# Patient Record
Sex: MALE | Employment: FULL TIME | URBAN - METROPOLITAN AREA
[De-identification: names, ages, dates, MRNs, and addresses within clinical notes are randomized per-mention and may not be internally consistent; named-entity substitution may affect disease eponyms.]

---

## 2021-01-01 ENCOUNTER — APPOINTMENT (OUTPATIENT)
Dept: CT IMAGING | Age: 46
DRG: 958 | End: 2021-01-01
Payer: OTHER GOVERNMENT

## 2021-01-01 ENCOUNTER — ANESTHESIA (OUTPATIENT)
Dept: OPERATING ROOM | Age: 46
DRG: 958 | End: 2021-01-01
Payer: OTHER GOVERNMENT

## 2021-01-01 ENCOUNTER — APPOINTMENT (OUTPATIENT)
Dept: GENERAL RADIOLOGY | Age: 46
DRG: 958 | End: 2021-01-01
Payer: OTHER GOVERNMENT

## 2021-01-01 ENCOUNTER — HOSPITAL ENCOUNTER (INPATIENT)
Age: 46
LOS: 8 days | Discharge: HOME | DRG: 958 | End: 2021-01-09
Attending: EMERGENCY MEDICINE | Admitting: SURGERY
Payer: OTHER GOVERNMENT

## 2021-01-01 ENCOUNTER — ANESTHESIA EVENT (OUTPATIENT)
Dept: OPERATING ROOM | Age: 46
DRG: 958 | End: 2021-01-01
Payer: OTHER GOVERNMENT

## 2021-01-01 ENCOUNTER — APPOINTMENT (OUTPATIENT)
Dept: MRI IMAGING | Age: 46
DRG: 958 | End: 2021-01-01
Payer: OTHER GOVERNMENT

## 2021-01-01 VITALS — OXYGEN SATURATION: 100 % | TEMPERATURE: 99.6 F | SYSTOLIC BLOOD PRESSURE: 110 MMHG | DIASTOLIC BLOOD PRESSURE: 55 MMHG
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
## 2021-01-01 LAB
ABO/RH: NORMAL
ABSOLUTE EOS #: <0.03 K/UL (ref 0–0.44)
ABSOLUTE IMMATURE GRANULOCYTE: 0.04 K/UL (ref 0–0.3)
ABSOLUTE LYMPH #: 0.71 K/UL (ref 1.1–3.7)
ABSOLUTE MONO #: 0.16 K/UL (ref 0.1–1.2)
ALLEN TEST: ABNORMAL
ANION GAP SERPL CALCULATED.3IONS-SCNC: 10 MMOL/L (ref 9–17)
ANION GAP SERPL CALCULATED.3IONS-SCNC: 12 MMOL/L (ref 9–17)
ANTIBODY SCREEN: NEGATIVE
ARM BAND NUMBER: NORMAL
BASOPHILS # BLD: 0 % (ref 0–2)
BASOPHILS ABSOLUTE: <0.03 K/UL (ref 0–0.2)
BLOOD BANK SPECIMEN: ABNORMAL
BUN BLDV-MCNC: 12 MG/DL (ref 6–20)
BUN BLDV-MCNC: 13 MG/DL (ref 6–20)
BUN/CREAT BLD: ABNORMAL (ref 9–20)
CALCIUM IONIZED: 1.08 MMOL/L (ref 1.13–1.33)
CALCIUM SERPL-MCNC: 8.4 MG/DL (ref 8.6–10.4)
CARBOXYHEMOGLOBIN: 6.1 % (ref 0–5)
CHLORIDE BLD-SCNC: 106 MMOL/L (ref 98–107)
CHLORIDE BLD-SCNC: 107 MMOL/L (ref 98–107)
CO2: 21 MMOL/L (ref 20–31)
CO2: 22 MMOL/L (ref 20–31)
CREAT SERPL-MCNC: 0.72 MG/DL (ref 0.7–1.2)
CREAT SERPL-MCNC: 0.73 MG/DL (ref 0.7–1.2)
DIFFERENTIAL TYPE: ABNORMAL
EOSINOPHILS RELATIVE PERCENT: 0 % (ref 1–4)
ETHANOL PERCENT: 0.09 %
ETHANOL: 92 MG/DL
EXPIRATION DATE: NORMAL
FIO2: ABNORMAL
GFR AFRICAN AMERICAN: >60 ML/MIN
GFR AFRICAN AMERICAN: >60 ML/MIN
GFR NON-AFRICAN AMERICAN: >60 ML/MIN
GFR NON-AFRICAN AMERICAN: >60 ML/MIN
GFR SERPL CREATININE-BSD FRML MDRD: ABNORMAL ML/MIN/{1.73_M2}
GLUCOSE BLD-MCNC: 86 MG/DL (ref 70–99)
GLUCOSE BLD-MCNC: 94 MG/DL (ref 70–99)
HCG QUALITATIVE: ABNORMAL
HCO3 VENOUS: 25.6 MMOL/L (ref 24–30)
HCT VFR BLD CALC: 43.5 % (ref 40.7–50.3)
HCT VFR BLD CALC: 43.6 % (ref 40.7–50.3)
HEMOGLOBIN: 14.3 G/DL (ref 13–17)
HEMOGLOBIN: 14.3 G/DL (ref 13–17)
IMMATURE GRANULOCYTES: 0 %
INR BLD: 0.9
LYMPHOCYTES # BLD: 6 % (ref 24–43)
MAGNESIUM: 1.8 MG/DL (ref 1.6–2.6)
MCH RBC QN AUTO: 31.6 PG (ref 25.2–33.5)
MCH RBC QN AUTO: 32.4 PG (ref 25.2–33.5)
MCHC RBC AUTO-ENTMCNC: 32.8 G/DL (ref 28.4–34.8)
MCHC RBC AUTO-ENTMCNC: 32.9 G/DL (ref 28.4–34.8)
MCV RBC AUTO: 96.5 FL (ref 82.6–102.9)
MCV RBC AUTO: 98.4 FL (ref 82.6–102.9)
METHEMOGLOBIN: ABNORMAL % (ref 0–1.5)
MODE: ABNORMAL
MONOCYTES # BLD: 1 % (ref 3–12)
NEGATIVE BASE EXCESS, VEN: 0 MMOL/L (ref 0–2)
NOTIFICATION TIME: ABNORMAL
NOTIFICATION: ABNORMAL
NRBC AUTOMATED: 0 PER 100 WBC
NRBC AUTOMATED: 0 PER 100 WBC
O2 DEVICE/FLOW/%: ABNORMAL
O2 SAT, VEN: 55.7 % (ref 60–85)
OXYHEMOGLOBIN: ABNORMAL % (ref 95–98)
PARTIAL THROMBOPLASTIN TIME: 25.8 SEC (ref 20.5–30.5)
PATIENT TEMP: 37
PCO2, VEN, TEMP ADJ: ABNORMAL MMHG (ref 39–55)
PCO2, VEN: 47.8 (ref 39–55)
PDW BLD-RTO: 12.7 % (ref 11.8–14.4)
PDW BLD-RTO: 12.7 % (ref 11.8–14.4)
PEEP/CPAP: ABNORMAL
PH VENOUS: 7.35 (ref 7.32–7.42)
PH, VEN, TEMP ADJ: ABNORMAL (ref 7.32–7.42)
PHOSPHORUS: 3.9 MG/DL (ref 2.5–4.5)
PLATELET # BLD: 233 K/UL (ref 138–453)
PLATELET # BLD: 258 K/UL (ref 138–453)
PLATELET ESTIMATE: ABNORMAL
PMV BLD AUTO: 9.3 FL (ref 8.1–13.5)
PMV BLD AUTO: 9.5 FL (ref 8.1–13.5)
PO2, VEN, TEMP ADJ: ABNORMAL MMHG (ref 30–50)
PO2, VEN: 31.7 (ref 30–50)
POSITIVE BASE EXCESS, VEN: ABNORMAL MMOL/L (ref 0–2)
POTASSIUM SERPL-SCNC: 4 MMOL/L (ref 3.7–5.3)
POTASSIUM SERPL-SCNC: 4.1 MMOL/L (ref 3.7–5.3)
PROTHROMBIN TIME: 9.9 SEC (ref 9–12)
PSV: ABNORMAL
PT. POSITION: ABNORMAL
RBC # BLD: 4.42 M/UL (ref 4.21–5.77)
RBC # BLD: 4.52 M/UL (ref 4.21–5.77)
RBC # BLD: ABNORMAL 10*6/UL
RESPIRATORY RATE: ABNORMAL
SAMPLE SITE: ABNORMAL
SARS-COV-2, RAPID: NOT DETECTED
SARS-COV-2: NORMAL
SARS-COV-2: NORMAL
SEG NEUTROPHILS: 92 % (ref 36–65)
SEGMENTED NEUTROPHILS ABSOLUTE COUNT: 11.17 K/UL (ref 1.5–8.1)
SET RATE: ABNORMAL
SODIUM BLD-SCNC: 139 MMOL/L (ref 135–144)
SODIUM BLD-SCNC: 139 MMOL/L (ref 135–144)
SOURCE: NORMAL
TEXT FOR RESPIRATORY: ABNORMAL
TOTAL HB: ABNORMAL G/DL (ref 12–16)
TOTAL RATE: ABNORMAL
TROPONIN INTERP: NORMAL
TROPONIN T: NORMAL NG/ML
TROPONIN, HIGH SENSITIVITY: <6 NG/L (ref 0–22)
VT: ABNORMAL
WBC # BLD: 12.1 K/UL (ref 3.5–11.3)
WBC # BLD: 21 K/UL (ref 3.5–11.3)
WBC # BLD: ABNORMAL 10*3/UL

## 2021-01-01 PROCEDURE — 85730 THROMBOPLASTIN TIME PARTIAL: CPT

## 2021-01-01 PROCEDURE — 92523 SPEECH SOUND LANG COMPREHEN: CPT

## 2021-01-01 PROCEDURE — 85610 PROTHROMBIN TIME: CPT

## 2021-01-01 PROCEDURE — 72020 X-RAY EXAM OF SPINE 1 VIEW: CPT

## 2021-01-01 PROCEDURE — 87641 MR-STAPH DNA AMP PROBE: CPT

## 2021-01-01 PROCEDURE — 80051 ELECTROLYTE PANEL: CPT

## 2021-01-01 PROCEDURE — APPSS30 APP SPLIT SHARED TIME 16-30 MINUTES: Performed by: PHYSICIAN ASSISTANT

## 2021-01-01 PROCEDURE — 3700000001 HC ADD 15 MINUTES (ANESTHESIA): Performed by: NEUROLOGICAL SURGERY

## 2021-01-01 PROCEDURE — 71260 CT THORAX DX C+: CPT

## 2021-01-01 PROCEDURE — 82805 BLOOD GASES W/O2 SATURATION: CPT

## 2021-01-01 PROCEDURE — 2580000003 HC RX 258: Performed by: STUDENT IN AN ORGANIZED HEALTH CARE EDUCATION/TRAINING PROGRAM

## 2021-01-01 PROCEDURE — 83735 ASSAY OF MAGNESIUM: CPT

## 2021-01-01 PROCEDURE — 70498 CT ANGIOGRAPHY NECK: CPT

## 2021-01-01 PROCEDURE — 02HV33Z INSERTION OF INFUSION DEVICE INTO SUPERIOR VENA CAVA, PERCUTANEOUS APPROACH: ICD-10-PCS | Performed by: NEUROLOGICAL SURGERY

## 2021-01-01 PROCEDURE — 6370000000 HC RX 637 (ALT 250 FOR IP): Performed by: PHYSICIAN ASSISTANT

## 2021-01-01 PROCEDURE — 70450 CT HEAD/BRAIN W/O DYE: CPT

## 2021-01-01 PROCEDURE — 2500000003 HC RX 250 WO HCPCS: Performed by: STUDENT IN AN ORGANIZED HEALTH CARE EDUCATION/TRAINING PROGRAM

## 2021-01-01 PROCEDURE — 86850 RBC ANTIBODY SCREEN: CPT

## 2021-01-01 PROCEDURE — 7100000001 HC PACU RECOVERY - ADDTL 15 MIN: Performed by: NEUROLOGICAL SURGERY

## 2021-01-01 PROCEDURE — 86900 BLOOD TYPING SEROLOGIC ABO: CPT

## 2021-01-01 PROCEDURE — 6360000002 HC RX W HCPCS: Performed by: GENERAL PRACTICE

## 2021-01-01 PROCEDURE — 6360000002 HC RX W HCPCS: Performed by: PHYSICIAN ASSISTANT

## 2021-01-01 PROCEDURE — 99285 EMERGENCY DEPT VISIT HI MDM: CPT

## 2021-01-01 PROCEDURE — 2580000003 HC RX 258: Performed by: NEUROLOGICAL SURGERY

## 2021-01-01 PROCEDURE — 2500000003 HC RX 250 WO HCPCS: Performed by: PHYSICIAN ASSISTANT

## 2021-01-01 PROCEDURE — 7100000000 HC PACU RECOVERY - FIRST 15 MIN: Performed by: NEUROLOGICAL SURGERY

## 2021-01-01 PROCEDURE — 6360000002 HC RX W HCPCS: Performed by: NEUROLOGICAL SURGERY

## 2021-01-01 PROCEDURE — 2580000003 HC RX 258: Performed by: GENERAL PRACTICE

## 2021-01-01 PROCEDURE — 0RG10A0 FUSION OF CERVICAL VERTEBRAL JOINT WITH INTERBODY FUSION DEVICE, ANTERIOR APPROACH, ANTERIOR COLUMN, OPEN APPROACH: ICD-10-PCS | Performed by: NEUROLOGICAL SURGERY

## 2021-01-01 PROCEDURE — 3600000014 HC SURGERY LEVEL 4 ADDTL 15MIN: Performed by: NEUROLOGICAL SURGERY

## 2021-01-01 PROCEDURE — 93005 ELECTROCARDIOGRAM TRACING: CPT | Performed by: STUDENT IN AN ORGANIZED HEALTH CARE EDUCATION/TRAINING PROGRAM

## 2021-01-01 PROCEDURE — 2500000003 HC RX 250 WO HCPCS: Performed by: GENERAL PRACTICE

## 2021-01-01 PROCEDURE — 00UT0KZ SUPPLEMENT SPINAL MENINGES WITH NONAUTOLOGOUS TISSUE SUBSTITUTE, OPEN APPROACH: ICD-10-PCS | Performed by: NEUROLOGICAL SURGERY

## 2021-01-01 PROCEDURE — C1713 ANCHOR/SCREW BN/BN,TIS/BN: HCPCS | Performed by: NEUROLOGICAL SURGERY

## 2021-01-01 PROCEDURE — 3209999900 CT THORACIC SPINE TRAUMA RECONSTRUCTION

## 2021-01-01 PROCEDURE — 6370000000 HC RX 637 (ALT 250 FOR IP): Performed by: STUDENT IN AN ORGANIZED HEALTH CARE EDUCATION/TRAINING PROGRAM

## 2021-01-01 PROCEDURE — 3209999900 CT LUMBAR SPINE TRAUMA RECONSTRUCTION

## 2021-01-01 PROCEDURE — 72141 MRI NECK SPINE W/O DYE: CPT

## 2021-01-01 PROCEDURE — 84484 ASSAY OF TROPONIN QUANT: CPT

## 2021-01-01 PROCEDURE — U0002 COVID-19 LAB TEST NON-CDC: HCPCS

## 2021-01-01 PROCEDURE — 82330 ASSAY OF CALCIUM: CPT

## 2021-01-01 PROCEDURE — C9359 IMPLNT,BON VOID FILLER-PUTTY: HCPCS | Performed by: NEUROLOGICAL SURGERY

## 2021-01-01 PROCEDURE — 72125 CT NECK SPINE W/O DYE: CPT

## 2021-01-01 PROCEDURE — 6360000004 HC RX CONTRAST MEDICATION: Performed by: STUDENT IN AN ORGANIZED HEALTH CARE EDUCATION/TRAINING PROGRAM

## 2021-01-01 PROCEDURE — 2720000010 HC SURG SUPPLY STERILE: Performed by: NEUROLOGICAL SURGERY

## 2021-01-01 PROCEDURE — 2500000003 HC RX 250 WO HCPCS: Performed by: NEUROLOGICAL SURGERY

## 2021-01-01 PROCEDURE — 85027 COMPLETE CBC AUTOMATED: CPT

## 2021-01-01 PROCEDURE — 80048 BASIC METABOLIC PNL TOTAL CA: CPT

## 2021-01-01 PROCEDURE — G0480 DRUG TEST DEF 1-7 CLASSES: HCPCS

## 2021-01-01 PROCEDURE — 84520 ASSAY OF UREA NITROGEN: CPT

## 2021-01-01 PROCEDURE — 2709999900 HC NON-CHARGEABLE SUPPLY: Performed by: NEUROLOGICAL SURGERY

## 2021-01-01 PROCEDURE — 3700000000 HC ANESTHESIA ATTENDED CARE: Performed by: NEUROLOGICAL SURGERY

## 2021-01-01 PROCEDURE — 6360000004 HC RX CONTRAST MEDICATION: Performed by: SURGERY

## 2021-01-01 PROCEDURE — 36620 INSERTION CATHETER ARTERY: CPT

## 2021-01-01 PROCEDURE — 0RB30ZZ EXCISION OF CERVICAL VERTEBRAL DISC, OPEN APPROACH: ICD-10-PCS | Performed by: NEUROLOGICAL SURGERY

## 2021-01-01 PROCEDURE — 86901 BLOOD TYPING SEROLOGIC RH(D): CPT

## 2021-01-01 PROCEDURE — 2500000003 HC RX 250 WO HCPCS: Performed by: NURSE ANESTHETIST, CERTIFIED REGISTERED

## 2021-01-01 PROCEDURE — 85025 COMPLETE CBC W/AUTO DIFF WBC: CPT

## 2021-01-01 PROCEDURE — C1889 IMPLANT/INSERT DEVICE, NOC: HCPCS | Performed by: NEUROLOGICAL SURGERY

## 2021-01-01 PROCEDURE — 2580000003 HC RX 258: Performed by: PHYSICIAN ASSISTANT

## 2021-01-01 PROCEDURE — 2580000003 HC RX 258: Performed by: NURSE ANESTHETIST, CERTIFIED REGISTERED

## 2021-01-01 PROCEDURE — 82947 ASSAY GLUCOSE BLOOD QUANT: CPT

## 2021-01-01 PROCEDURE — 84703 CHORIONIC GONADOTROPIN ASSAY: CPT

## 2021-01-01 PROCEDURE — 36556 INSERT NON-TUNNEL CV CATH: CPT

## 2021-01-01 PROCEDURE — 2780000010 HC IMPLANT OTHER: Performed by: NEUROLOGICAL SURGERY

## 2021-01-01 PROCEDURE — 82565 ASSAY OF CREATININE: CPT

## 2021-01-01 PROCEDURE — 2000000000 HC ICU R&B

## 2021-01-01 PROCEDURE — 6360000002 HC RX W HCPCS: Performed by: NURSE ANESTHETIST, CERTIFIED REGISTERED

## 2021-01-01 PROCEDURE — 84100 ASSAY OF PHOSPHORUS: CPT

## 2021-01-01 PROCEDURE — 3600000004 HC SURGERY LEVEL 4 BASE: Performed by: NEUROLOGICAL SURGERY

## 2021-01-01 DEVICE — IMPLANTABLE DEVICE
Type: IMPLANTABLE DEVICE | Site: NECK | Status: FUNCTIONAL
Brand: TRINICA®

## 2021-01-01 DEVICE — IMPLANTABLE DEVICE
Type: IMPLANTABLE DEVICE | Site: NECK | Status: FUNCTIONAL
Brand: TRINICA® TRINICA®

## 2021-01-01 DEVICE — GRAFT BNE SUB M GRN CA CRBNT PTTY INJ RESRB SIGNAFUSE: Type: IMPLANTABLE DEVICE | Status: FUNCTIONAL

## 2021-01-01 DEVICE — SPACER SPNL W12XH7XL14MM 5DEG PEEK OPTMA ANTR CERV LORDTC LO: Type: IMPLANTABLE DEVICE | Site: NECK | Status: FUNCTIONAL

## 2021-01-01 DEVICE — COLLAGEN DURAL REGENERATION MEMBRANE 1IN X 1IN (2.5CM X 2.5CM)
Type: IMPLANTABLE DEVICE | Status: FUNCTIONAL
Brand: DURAMATRIX-ONLAY PLUS

## 2021-01-01 RX ORDER — OXYCODONE HYDROCHLORIDE 5 MG/1
5 TABLET ORAL EVERY 4 HOURS PRN
Status: DISCONTINUED | OUTPATIENT
Start: 2021-01-01 | End: 2021-01-02

## 2021-01-01 RX ORDER — FENTANYL CITRATE 50 UG/ML
INJECTION, SOLUTION INTRAMUSCULAR; INTRAVENOUS PRN
Status: DISCONTINUED | OUTPATIENT
Start: 2021-01-01 | End: 2021-01-01 | Stop reason: SDUPTHER

## 2021-01-01 RX ORDER — FENTANYL CITRATE 50 UG/ML
50 INJECTION, SOLUTION INTRAMUSCULAR; INTRAVENOUS ONCE
Status: DISCONTINUED | OUTPATIENT
Start: 2021-01-01 | End: 2021-01-02

## 2021-01-01 RX ORDER — METHOCARBAMOL 500 MG/1
750 TABLET, FILM COATED ORAL 4 TIMES DAILY
Status: DISCONTINUED | OUTPATIENT
Start: 2021-01-01 | End: 2021-01-02

## 2021-01-01 RX ORDER — MORPHINE SULFATE 4 MG/ML
4 INJECTION, SOLUTION INTRAMUSCULAR; INTRAVENOUS ONCE
Status: DISCONTINUED | OUTPATIENT
Start: 2021-01-01 | End: 2021-01-02

## 2021-01-01 RX ORDER — ROCURONIUM BROMIDE 10 MG/ML
INJECTION, SOLUTION INTRAVENOUS PRN
Status: DISCONTINUED | OUTPATIENT
Start: 2021-01-01 | End: 2021-01-01 | Stop reason: SDUPTHER

## 2021-01-01 RX ORDER — POLYETHYLENE GLYCOL 3350 17 G/17G
17 POWDER, FOR SOLUTION ORAL DAILY PRN
Status: DISCONTINUED | OUTPATIENT
Start: 2021-01-01 | End: 2021-01-02

## 2021-01-01 RX ORDER — FENTANYL CITRATE 50 UG/ML
25 INJECTION, SOLUTION INTRAMUSCULAR; INTRAVENOUS
Status: DISCONTINUED | OUTPATIENT
Start: 2021-01-01 | End: 2021-01-02

## 2021-01-01 RX ORDER — SODIUM CHLORIDE 0.9 % (FLUSH) 0.9 %
10 SYRINGE (ML) INJECTION PRN
Status: DISCONTINUED | OUTPATIENT
Start: 2021-01-01 | End: 2021-01-02

## 2021-01-01 RX ORDER — LIDOCAINE HYDROCHLORIDE AND EPINEPHRINE 10; 10 MG/ML; UG/ML
INJECTION, SOLUTION INFILTRATION; PERINEURAL PRN
Status: DISCONTINUED | OUTPATIENT
Start: 2021-01-01 | End: 2021-01-01 | Stop reason: HOSPADM

## 2021-01-01 RX ORDER — OXYCODONE HYDROCHLORIDE 5 MG/1
10 TABLET ORAL EVERY 4 HOURS PRN
Status: DISCONTINUED | OUTPATIENT
Start: 2021-01-01 | End: 2021-01-01

## 2021-01-01 RX ORDER — LIDOCAINE HYDROCHLORIDE 10 MG/ML
INJECTION, SOLUTION EPIDURAL; INFILTRATION; INTRACAUDAL; PERINEURAL PRN
Status: DISCONTINUED | OUTPATIENT
Start: 2021-01-01 | End: 2021-01-01 | Stop reason: SDUPTHER

## 2021-01-01 RX ORDER — SODIUM CHLORIDE 0.9 % (FLUSH) 0.9 %
10 SYRINGE (ML) INJECTION EVERY 12 HOURS SCHEDULED
Status: DISCONTINUED | OUTPATIENT
Start: 2021-01-01 | End: 2021-01-02

## 2021-01-01 RX ORDER — MORPHINE SULFATE 4 MG/ML
4 INJECTION, SOLUTION INTRAMUSCULAR; INTRAVENOUS
Status: DISCONTINUED | OUTPATIENT
Start: 2021-01-01 | End: 2021-01-01

## 2021-01-01 RX ORDER — MAGNESIUM HYDROXIDE 1200 MG/15ML
LIQUID ORAL CONTINUOUS PRN
Status: COMPLETED | OUTPATIENT
Start: 2021-01-01 | End: 2021-01-01

## 2021-01-01 RX ORDER — GLYCOPYRROLATE 1 MG/5 ML
SYRINGE (ML) INTRAVENOUS PRN
Status: DISCONTINUED | OUTPATIENT
Start: 2021-01-01 | End: 2021-01-01 | Stop reason: SDUPTHER

## 2021-01-01 RX ORDER — MIDAZOLAM HYDROCHLORIDE 1 MG/ML
INJECTION INTRAMUSCULAR; INTRAVENOUS PRN
Status: DISCONTINUED | OUTPATIENT
Start: 2021-01-01 | End: 2021-01-01 | Stop reason: SDUPTHER

## 2021-01-01 RX ORDER — POLYETHYLENE GLYCOL 3350 17 G/17G
17 POWDER, FOR SOLUTION ORAL DAILY PRN
Status: DISCONTINUED | OUTPATIENT
Start: 2021-01-01 | End: 2021-01-05

## 2021-01-01 RX ORDER — ACETAMINOPHEN 325 MG/1
650 TABLET ORAL EVERY 4 HOURS PRN
Status: DISCONTINUED | OUTPATIENT
Start: 2021-01-01 | End: 2021-01-01

## 2021-01-01 RX ORDER — CEFAZOLIN SODIUM 1 G/3ML
INJECTION, POWDER, FOR SOLUTION INTRAMUSCULAR; INTRAVENOUS PRN
Status: DISCONTINUED | OUTPATIENT
Start: 2021-01-01 | End: 2021-01-01 | Stop reason: SDUPTHER

## 2021-01-01 RX ORDER — NOREPINEPHRINE BIT/0.9 % NACL 16MG/250ML
2 INFUSION BOTTLE (ML) INTRAVENOUS CONTINUOUS
Status: DISCONTINUED | OUTPATIENT
Start: 2021-01-01 | End: 2021-01-08

## 2021-01-01 RX ORDER — ONDANSETRON 2 MG/ML
4 INJECTION INTRAMUSCULAR; INTRAVENOUS EVERY 6 HOURS PRN
Status: DISCONTINUED | OUTPATIENT
Start: 2021-01-01 | End: 2021-01-05

## 2021-01-01 RX ORDER — SODIUM CHLORIDE 0.9 % (FLUSH) 0.9 %
10 SYRINGE (ML) INJECTION PRN
Status: DISCONTINUED | OUTPATIENT
Start: 2021-01-01 | End: 2021-01-09 | Stop reason: HOSPADM

## 2021-01-01 RX ORDER — PHENYLEPHRINE HCL IN 0.9% NACL 1 MG/10 ML
SYRINGE (ML) INTRAVENOUS PRN
Status: DISCONTINUED | OUTPATIENT
Start: 2021-01-01 | End: 2021-01-01 | Stop reason: SDUPTHER

## 2021-01-01 RX ORDER — BISACODYL 10 MG
10 SUPPOSITORY, RECTAL RECTAL DAILY PRN
Status: DISCONTINUED | OUTPATIENT
Start: 2021-01-01 | End: 2021-01-05

## 2021-01-01 RX ORDER — ACETAMINOPHEN 160 MG/5ML
1000 SOLUTION ORAL EVERY 8 HOURS
Status: DISCONTINUED | OUTPATIENT
Start: 2021-01-01 | End: 2021-01-03

## 2021-01-01 RX ORDER — OXYCODONE HYDROCHLORIDE 5 MG/1
5 TABLET ORAL EVERY 4 HOURS PRN
Status: DISCONTINUED | OUTPATIENT
Start: 2021-01-01 | End: 2021-01-01

## 2021-01-01 RX ORDER — SODIUM CHLORIDE 9 MG/ML
INJECTION, SOLUTION INTRAVENOUS CONTINUOUS
Status: DISCONTINUED | OUTPATIENT
Start: 2021-01-01 | End: 2021-01-02

## 2021-01-01 RX ORDER — PROPOFOL 10 MG/ML
INJECTION, EMULSION INTRAVENOUS PRN
Status: DISCONTINUED | OUTPATIENT
Start: 2021-01-01 | End: 2021-01-01 | Stop reason: SDUPTHER

## 2021-01-01 RX ORDER — GABAPENTIN 300 MG/1
300 CAPSULE ORAL EVERY 8 HOURS
Status: DISCONTINUED | OUTPATIENT
Start: 2021-01-01 | End: 2021-01-02

## 2021-01-01 RX ORDER — ONDANSETRON 2 MG/ML
4 INJECTION INTRAMUSCULAR; INTRAVENOUS EVERY 6 HOURS PRN
Status: DISCONTINUED | OUTPATIENT
Start: 2021-01-01 | End: 2021-01-04 | Stop reason: SDUPTHER

## 2021-01-01 RX ORDER — NEOSTIGMINE METHYLSULFATE 5 MG/5 ML
SYRINGE (ML) INTRAVENOUS PRN
Status: DISCONTINUED | OUTPATIENT
Start: 2021-01-01 | End: 2021-01-01 | Stop reason: SDUPTHER

## 2021-01-01 RX ORDER — SODIUM CHLORIDE, SODIUM LACTATE, POTASSIUM CHLORIDE, CALCIUM CHLORIDE 600; 310; 30; 20 MG/100ML; MG/100ML; MG/100ML; MG/100ML
INJECTION, SOLUTION INTRAVENOUS CONTINUOUS PRN
Status: DISCONTINUED | OUTPATIENT
Start: 2021-01-01 | End: 2021-01-01 | Stop reason: SDUPTHER

## 2021-01-01 RX ORDER — MORPHINE SULFATE 2 MG/ML
2 INJECTION, SOLUTION INTRAMUSCULAR; INTRAVENOUS
Status: DISCONTINUED | OUTPATIENT
Start: 2021-01-01 | End: 2021-01-01

## 2021-01-01 RX ORDER — DOCUSATE SODIUM 100 MG/1
100 CAPSULE, LIQUID FILLED ORAL DAILY
Status: DISCONTINUED | OUTPATIENT
Start: 2021-01-01 | End: 2021-01-09 | Stop reason: HOSPADM

## 2021-01-01 RX ORDER — FENTANYL CITRATE 50 UG/ML
50 INJECTION, SOLUTION INTRAMUSCULAR; INTRAVENOUS
Status: DISCONTINUED | OUTPATIENT
Start: 2021-01-01 | End: 2021-01-02

## 2021-01-01 RX ORDER — ONDANSETRON 2 MG/ML
INJECTION INTRAMUSCULAR; INTRAVENOUS PRN
Status: DISCONTINUED | OUTPATIENT
Start: 2021-01-01 | End: 2021-01-01 | Stop reason: SDUPTHER

## 2021-01-01 RX ORDER — METHYLENE BLUE 10 MG/ML
INJECTION INTRAVENOUS PRN
Status: DISCONTINUED | OUTPATIENT
Start: 2021-01-01 | End: 2021-01-01 | Stop reason: HOSPADM

## 2021-01-01 RX ORDER — SODIUM CHLORIDE 0.9 % (FLUSH) 0.9 %
10 SYRINGE (ML) INJECTION EVERY 12 HOURS SCHEDULED
Status: DISCONTINUED | OUTPATIENT
Start: 2021-01-01 | End: 2021-01-09 | Stop reason: HOSPADM

## 2021-01-01 RX ORDER — IBUPROFEN 400 MG/1
400 TABLET ORAL EVERY 6 HOURS PRN
Status: DISCONTINUED | OUTPATIENT
Start: 2021-01-01 | End: 2021-01-01

## 2021-01-01 RX ORDER — SENNA AND DOCUSATE SODIUM 50; 8.6 MG/1; MG/1
1 TABLET, FILM COATED ORAL 2 TIMES DAILY
Status: DISCONTINUED | OUTPATIENT
Start: 2021-01-01 | End: 2021-01-01

## 2021-01-01 RX ORDER — DEXAMETHASONE SODIUM PHOSPHATE 4 MG/ML
INJECTION, SOLUTION INTRA-ARTICULAR; INTRALESIONAL; INTRAMUSCULAR; INTRAVENOUS; SOFT TISSUE PRN
Status: DISCONTINUED | OUTPATIENT
Start: 2021-01-01 | End: 2021-01-01 | Stop reason: SDUPTHER

## 2021-01-01 RX ORDER — PROMETHAZINE HYDROCHLORIDE 12.5 MG/1
12.5 TABLET ORAL EVERY 6 HOURS PRN
Status: DISCONTINUED | OUTPATIENT
Start: 2021-01-01 | End: 2021-01-02

## 2021-01-01 RX ADMIN — Medication 0.8 MG: at 14:03

## 2021-01-01 RX ADMIN — Medication 100 MCG: at 12:31

## 2021-01-01 RX ADMIN — FENTANYL CITRATE 50 MCG: 50 INJECTION, SOLUTION INTRAMUSCULAR; INTRAVENOUS at 08:51

## 2021-01-01 RX ADMIN — Medication 2 MCG/MIN: at 18:23

## 2021-01-01 RX ADMIN — GABAPENTIN 300 MG: 300 CAPSULE ORAL at 17:55

## 2021-01-01 RX ADMIN — MIDAZOLAM HYDROCHLORIDE 2 MG: 1 INJECTION, SOLUTION INTRAMUSCULAR; INTRAVENOUS at 12:14

## 2021-01-01 RX ADMIN — Medication 4 MG: at 14:03

## 2021-01-01 RX ADMIN — CALCIUM CHLORIDE 1 G: 100 INJECTION, SOLUTION INTRAVENOUS; INTRAVENTRICULAR at 20:50

## 2021-01-01 RX ADMIN — FENTANYL CITRATE 50 MCG: 50 INJECTION, SOLUTION INTRAMUSCULAR; INTRAVENOUS at 13:47

## 2021-01-01 RX ADMIN — IOPAMIDOL 90 ML: 755 INJECTION, SOLUTION INTRAVENOUS at 06:48

## 2021-01-01 RX ADMIN — METHOCARBAMOL TABLETS 750 MG: 500 TABLET, COATED ORAL at 20:49

## 2021-01-01 RX ADMIN — ONDANSETRON 4 MG: 2 INJECTION INTRAMUSCULAR; INTRAVENOUS at 09:29

## 2021-01-01 RX ADMIN — FAMOTIDINE 20 MG: 10 INJECTION INTRAVENOUS at 10:24

## 2021-01-01 RX ADMIN — SODIUM CHLORIDE, POTASSIUM CHLORIDE, SODIUM LACTATE AND CALCIUM CHLORIDE: 600; 310; 30; 20 INJECTION, SOLUTION INTRAVENOUS at 12:13

## 2021-01-01 RX ADMIN — FENTANYL CITRATE 50 MCG: 50 INJECTION, SOLUTION INTRAMUSCULAR; INTRAVENOUS at 10:25

## 2021-01-01 RX ADMIN — ROCURONIUM BROMIDE 60 MG: 10 INJECTION, SOLUTION INTRAVENOUS at 12:19

## 2021-01-01 RX ADMIN — Medication 100 MCG: at 12:35

## 2021-01-01 RX ADMIN — DOCUSATE SODIUM 100 MG: 100 CAPSULE, LIQUID FILLED ORAL at 17:55

## 2021-01-01 RX ADMIN — SODIUM CHLORIDE: 9 INJECTION, SOLUTION INTRAVENOUS at 15:50

## 2021-01-01 RX ADMIN — SODIUM CHLORIDE, POTASSIUM CHLORIDE, SODIUM LACTATE AND CALCIUM CHLORIDE: 600; 310; 30; 20 INJECTION, SOLUTION INTRAVENOUS at 14:01

## 2021-01-01 RX ADMIN — CEFAZOLIN 2000 MG: 1 INJECTION, POWDER, FOR SOLUTION INTRAMUSCULAR; INTRAVENOUS at 12:30

## 2021-01-01 RX ADMIN — LIDOCAINE HYDROCHLORIDE 50 MG: 10 INJECTION, SOLUTION EPIDURAL; INFILTRATION; INTRACAUDAL; PERINEURAL at 12:19

## 2021-01-01 RX ADMIN — Medication 100 MCG: at 13:46

## 2021-01-01 RX ADMIN — SODIUM CHLORIDE, PRESERVATIVE FREE 10 ML: 5 INJECTION INTRAVENOUS at 10:26

## 2021-01-01 RX ADMIN — METHOCARBAMOL TABLETS 750 MG: 500 TABLET, COATED ORAL at 17:55

## 2021-01-01 RX ADMIN — IOPAMIDOL 130 ML: 755 INJECTION, SOLUTION INTRAVENOUS at 06:12

## 2021-01-01 RX ADMIN — ACETAMINOPHEN 1000 MG: 650 SOLUTION ORAL at 17:55

## 2021-01-01 RX ADMIN — ONDANSETRON 4 MG: 2 INJECTION INTRAMUSCULAR; INTRAVENOUS at 13:55

## 2021-01-01 RX ADMIN — DEXTROSE MONOHYDRATE 2 G: 50 INJECTION, SOLUTION INTRAVENOUS at 20:49

## 2021-01-01 RX ADMIN — DEXAMETHASONE SODIUM PHOSPHATE 12 MG: 4 INJECTION, SOLUTION INTRAMUSCULAR; INTRAVENOUS at 12:44

## 2021-01-01 RX ADMIN — FENTANYL CITRATE 50 MCG: 50 INJECTION, SOLUTION INTRAMUSCULAR; INTRAVENOUS at 12:16

## 2021-01-01 RX ADMIN — FENTANYL CITRATE 50 MCG: 50 INJECTION, SOLUTION INTRAMUSCULAR; INTRAVENOUS at 13:18

## 2021-01-01 RX ADMIN — FAMOTIDINE 20 MG: 10 INJECTION INTRAVENOUS at 22:24

## 2021-01-01 RX ADMIN — PROPOFOL 200 MG: 10 INJECTION, EMULSION INTRAVENOUS at 12:19

## 2021-01-01 RX ADMIN — Medication 0.4 MG: at 12:38

## 2021-01-01 RX ADMIN — FENTANYL CITRATE 50 MCG: 50 INJECTION, SOLUTION INTRAMUSCULAR; INTRAVENOUS at 12:19

## 2021-01-01 ASSESSMENT — PAIN SCALES - GENERAL
PAINLEVEL_OUTOF10: 0
PAINLEVEL_OUTOF10: 10
PAINLEVEL_OUTOF10: 6

## 2021-01-01 ASSESSMENT — PULMONARY FUNCTION TESTS
PIF_VALUE: 20
PIF_VALUE: 20
PIF_VALUE: 21
PIF_VALUE: 18
PIF_VALUE: 11
PIF_VALUE: 20
PIF_VALUE: 16
PIF_VALUE: 17
PIF_VALUE: 20
PIF_VALUE: 20
PIF_VALUE: 23
PIF_VALUE: 20
PIF_VALUE: 22
PIF_VALUE: 18
PIF_VALUE: 16
PIF_VALUE: 16
PIF_VALUE: 17
PIF_VALUE: 20
PIF_VALUE: 23
PIF_VALUE: 17
PIF_VALUE: 18
PIF_VALUE: 17
PIF_VALUE: 18
PIF_VALUE: 18
PIF_VALUE: 17
PIF_VALUE: 16
PIF_VALUE: 20
PIF_VALUE: 16
PIF_VALUE: 20
PIF_VALUE: 21
PIF_VALUE: 22
PIF_VALUE: 17
PIF_VALUE: 20
PIF_VALUE: 17
PIF_VALUE: 16
PIF_VALUE: 20
PIF_VALUE: 16
PIF_VALUE: 0
PIF_VALUE: 11
PIF_VALUE: 18
PIF_VALUE: 20
PIF_VALUE: 20
PIF_VALUE: 17
PIF_VALUE: 1
PIF_VALUE: 19
PIF_VALUE: 16
PIF_VALUE: 23
PIF_VALUE: 15
PIF_VALUE: 22
PIF_VALUE: 1
PIF_VALUE: 21
PIF_VALUE: 21
PIF_VALUE: 20
PIF_VALUE: 20
PIF_VALUE: 1
PIF_VALUE: 18
PIF_VALUE: 0
PIF_VALUE: 17
PIF_VALUE: 16
PIF_VALUE: 13
PIF_VALUE: 17
PIF_VALUE: 20
PIF_VALUE: 17
PIF_VALUE: 20
PIF_VALUE: 20
PIF_VALUE: 17
PIF_VALUE: 17
PIF_VALUE: 1
PIF_VALUE: 16

## 2021-01-01 ASSESSMENT — ENCOUNTER SYMPTOMS
VOICE CHANGE: 0
SHORTNESS OF BREATH: 0
SORE THROAT: 0
ABDOMINAL PAIN: 0
TROUBLE SWALLOWING: 0
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
## 2021-01-01 NOTE — PROCEDURES
Lori Cardona is a 39 y.o. male patient. 1. Injury resulting from fall from height      No past medical history on file. Blood pressure (!) 119/54, pulse 63, temperature 99.3 °F (37.4 °C), temperature source Oral, resp. rate 18, height 5' 11\" (1.803 m), weight 189 lb 2.5 oz (85.8 kg), SpO2 95 %. Central Line    Date/Time: 1/1/2021 6:20 PM  Performed by: Sony Whittaker DO  Authorized by: Shani Martinez MD   Consent: Verbal consent obtained. Risks and benefits: risks, benefits and alternatives were discussed  Consent given by: patient  Patient understanding: patient states understanding of the procedure being performed  Required items: required blood products, implants, devices, and special equipment available  Patient identity confirmed: verbally with patient  Time out: Immediately prior to procedure a \"time out\" was called to verify the correct patient, procedure, equipment, support staff and site/side marked as required.   Indications: vascular access  Anesthesia: local infiltration    Anesthesia:  Local Anesthetic: lidocaine 1% without epinephrine  Anesthetic total: 3 mL  Preparation: skin prepped with ChloraPrep  Skin prep agent dried: skin prep agent completely dried prior to procedure  Sterile barriers: all five maximum sterile barriers used - cap, mask, sterile gown, sterile gloves, and large sterile sheet  Hand hygiene: hand hygiene performed prior to central venous catheter insertion  Location details: right femoral  Site selection rationale: C-Collar   Patient position: flat  Catheter type: triple lumen  Catheter size: 7.5 Fr  Pre-procedure: landmarks identified  Ultrasound guidance: yes  Sterile ultrasound techniques: sterile gel and sterile probe covers were used  Number of attempts: 1  Successful placement: yes  Post-procedure: line sutured and dressing applied  Assessment: blood return through all ports and free fluid flow  Patient tolerance: patient tolerated the procedure well with no immediate

## 2021-01-01 NOTE — ED NOTES
Pt c/o pain, fentanyl not due at this time. Morphine given @0800. Pt given zofran IV for nausea and remains on full monitor.       Jovan Marshall RN  01/01/21 1809
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
## 2021-01-01 NOTE — BRIEF OP NOTE
Brief Postoperative Note      Patient: Sonia Bronson  YOB: 1975  MRN: [id number]    Date of Procedure: 1/1/2021    Pre-Op Diagnosis: add-on    Post-Op Diagnosis: C6-7 traumatic disc rupture       Procedure(s):  C6-7 CERVICAL DISCECTOMY FUSION ANTERIOR    Surgeon(s):  Ross Garcia MD    Assistant:   Assistant: Rosie Kendall    Anesthesia: General    Estimated Blood Loss (mL): less than 50     Complications: None    Specimens:   * No specimens in log *    Implants:  Implant Name Type Inv. Item Serial No.  Lot No. LRB No. Used Action   GRAFT BNE SUB M GRN CA CRBNT PTTY INJ RESRB SIGNAFUSE  GRAFT BNE SUB M GRN CA CRBNT PTTY INJ RESRB SIGNAFUSE  [employer] [facility]-WD [id number] N/A 1 Implanted   GRAFT DURA [id number] CLLGN SUTURELESS HIGHLY CNFRM RESTR - [id number]  GRAFT DURA [id number] CLLGN SUTURELESS HIGHLY CNFRM RESTR [id number] [employer]WD [id number] N/A 1 Implanted   SCREW SPNL L14MM DIA4. 2MM GRN SLV TI SELF DRL NOMAN ANG FULL  SCREW SPNL L14MM DIA4. 2MM GRN SLV TI SELF DRL NOMAN ANG FULL  [employer]  N/A 4 Implanted   SPACER SPNL [id number] 5DEG PEEK OPTMA ANTR CERV LORDTC LO  SPACER SPNL [id number] 5DEG PEEK OPTMA ANTR CERV LORDTC LO  [employer]WD  N/A 1 Implanted   PLATE SPNL [id number] LEV STD TI ALLY ANT CERV TRINICA QUIRINO  PLATE SPNL [id number] 1 LEV STD TI ALLY ANT CERV TRINICA QUIRINO  [employer]WD  N/A 1 Implanted         Drains:   Urethral Catheter (Active)   Catheter Indications Need for fluid management in critically ill patients in a critical care setting not able to be managed by other means such as BSC with hat, bedpan, urinal, condom catheter, or short term intermittent urethral catherization 01/01/21 1000   Site Assessment No urethral drainage 01/01/21 1000   Urine Color Yellow 01/01/21 1000   Urine Appearance Clear 01/01/21 1000   Output (mL) 800 mL 01/01/21 1000       Findings:  There is a large disc rupture which had retropulsed behind the body of C6 through the posterior longitudinal ligament and off to the right side compressing the cord and it also insinuated itself within the dura there is a very small CSF leak on the right side which was repaired with DuraGen    Electronically signed by Brown Olea MD on 1/1/2021 at 1:59 PM
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
## 2021-01-01 NOTE — ANESTHESIA PRE PROCEDURE
Department of Anesthesiology  Preprocedure Note       Name:  Dane Leal   Age:  39 y.o.  :  1975                                          MRN:  [id number]         Date:  2021      Surgeon: Fidelina George):  Tracey Meza MD    Procedure: Procedure(s):  C6-7 CERVICAL DISCECTOMY FUSION ANTERIOR    Medications prior to admission:   Prior to Admission medications    Not on File       Current medications:    Current Facility-Administered Medications   Medication Dose Route Frequency Provider Last Rate Last Admin    fentaNYL (SUBLIMAZE) injection 50 mcg  50 mcg Intravenous Once James Perez MD        sodium chloride flush 0.9 % injection 10 mL  10 mL Intravenous 2 times per day Delos Rumple, DO   10 mL at 21 1026    sodium chloride flush 0.9 % injection 10 mL  10 mL Intravenous PRN Delos Rumple, DO        acetaminophen (TYLENOL) tablet 650 mg  650 mg Oral Q4H PRN Delos Rumple, DO        ibuprofen (ADVIL;MOTRIN) tablet 400 mg  400 mg Oral Q6H PRN Delos Rumple, DO        oxyCODONE (ROXICODONE) immediate release tablet 5 mg  5 mg Oral Q4H PRN Delos Rumple, DO        fentaNYL (SUBLIMAZE) injection 25 mcg  25 mcg Intravenous Q1H PRN Delos Rumple, DO        Or    fentaNYL (SUBLIMAZE) injection 50 mcg  50 mcg Intravenous Q1H PRN Delos Rumple, DO   50 mcg at 21 1025    methocarbamol (ROBAXIN) tablet 750 mg  750 mg Oral 4x Daily Delos Rumple, DO        polyethylene glycol (GLYCOLAX) packet 17 g  17 g Oral Daily PRN Delos Rumple, DO        [facility]etron [facility]) injection 4 mg  4 mg Intravenous Q6H PRN Delos Rumple, DO   4 mg at 21 1230    famotidine (PEPCID) injection 20 mg  20 mg Intravenous BID Delos Rumple, DO   20 mg at 21 1024    morphine injection 4 mg  4 mg Intravenous Once Siri Tiffany, DO           Allergies:  Not on File    Problem List:    Patient Active Problem List   Diagnosis Code No results found for: HIV, HEPCAB    COVID-19 Screening (If Applicable):   Lab Results   Component Value Date    COVID19 Not Detected 01/01/2021         Anesthesia Evaluation  Patient summary reviewed and Nursing notes reviewed no history of anesthetic complications:   Airway: Mallampati: III     Neck ROM: limited   Dental: normal exam         Pulmonary:normal exam                               Cardiovascular:Negative CV ROS        (-) past MI, CAD, CABG/stent, dysrhythmias,  angina and  CHF      Rhythm: regular  Rate: normal           Beta Blocker:  Not on Beta Blocker         Neuro/Psych:   Negative Neuro/Psych ROS              GI/Hepatic/Renal: Neg GI/Hepatic/Renal ROS            Endo/Other: Negative Endo/Other ROS                    Abdominal:           Vascular:                                      Anesthesia Plan      general     ASA 2       Induction: intravenous. MIPS: Postoperative opioids intended and Prophylactic antiemetics administered. Anesthetic plan and risks discussed with patient.       Plan discussed with CRNA and surgical team.                  Genaro Díaz MD   1/1/2021

## 2021-01-02 ENCOUNTER — APPOINTMENT (OUTPATIENT)
Dept: GENERAL RADIOLOGY | Age: 46
DRG: 958 | End: 2021-01-02
Payer: OTHER GOVERNMENT

## 2021-01-02 LAB
ALBUMIN SERPL-MCNC: 3.4 G/DL (ref 3.5–5.2)
ALBUMIN/GLOBULIN RATIO: 1.5 (ref 1–2.5)
ALP BLD-CCNC: 62 U/L (ref 40–129)
ALT SERPL-CCNC: 21 U/L (ref 5–41)
ANION GAP SERPL CALCULATED.3IONS-SCNC: 13 MMOL/L (ref 9–17)
AST SERPL-CCNC: 22 U/L
BILIRUB SERPL-MCNC: 0.62 MG/DL (ref 0.3–1.2)
BUN BLDV-MCNC: 10 MG/DL (ref 6–20)
BUN/CREAT BLD: ABNORMAL (ref 9–20)
CALCIUM SERPL-MCNC: 8.5 MG/DL (ref 8.6–10.4)
CHLORIDE BLD-SCNC: 106 MMOL/L (ref 98–107)
CO2: 20 MMOL/L (ref 20–31)
CREAT SERPL-MCNC: 0.61 MG/DL (ref 0.7–1.2)
GFR AFRICAN AMERICAN: >60 ML/MIN
GFR NON-AFRICAN AMERICAN: >60 ML/MIN
GFR SERPL CREATININE-BSD FRML MDRD: ABNORMAL ML/MIN/{1.73_M2}
GFR SERPL CREATININE-BSD FRML MDRD: ABNORMAL ML/MIN/{1.73_M2}
GLUCOSE BLD-MCNC: 125 MG/DL (ref 70–99)
HCT VFR BLD CALC: 40.9 % (ref 40.7–50.3)
HEMOGLOBIN: 13.6 G/DL (ref 13–17)
INR BLD: 1
MCH RBC QN AUTO: 32.1 PG (ref 25.2–33.5)
MCHC RBC AUTO-ENTMCNC: 33.3 G/DL (ref 28.4–34.8)
MCV RBC AUTO: 96.5 FL (ref 82.6–102.9)
MRSA, DNA, NASAL: NORMAL
NRBC AUTOMATED: 0 PER 100 WBC
PARTIAL THROMBOPLASTIN TIME: 26.6 SEC (ref 20.5–30.5)
PDW BLD-RTO: 12.2 % (ref 11.8–14.4)
PLATELET # BLD: 239 K/UL (ref 138–453)
PMV BLD AUTO: 9.2 FL (ref 8.1–13.5)
POTASSIUM SERPL-SCNC: 4.1 MMOL/L (ref 3.7–5.3)
PROTHROMBIN TIME: 10.5 SEC (ref 9–12)
RBC # BLD: 4.24 M/UL (ref 4.21–5.77)
SODIUM BLD-SCNC: 139 MMOL/L (ref 135–144)
SPECIMEN DESCRIPTION: NORMAL
TOTAL PROTEIN: 5.7 G/DL (ref 6.4–8.3)
WBC # BLD: 16.9 K/UL (ref 3.5–11.3)

## 2021-01-02 PROCEDURE — 2580000003 HC RX 258: Performed by: PHYSICIAN ASSISTANT

## 2021-01-02 PROCEDURE — 73090 X-RAY EXAM OF FOREARM: CPT

## 2021-01-02 PROCEDURE — 6370000000 HC RX 637 (ALT 250 FOR IP): Performed by: STUDENT IN AN ORGANIZED HEALTH CARE EDUCATION/TRAINING PROGRAM

## 2021-01-02 PROCEDURE — 85610 PROTHROMBIN TIME: CPT

## 2021-01-02 PROCEDURE — 2500000003 HC RX 250 WO HCPCS: Performed by: PHYSICIAN ASSISTANT

## 2021-01-02 PROCEDURE — 85027 COMPLETE CBC AUTOMATED: CPT

## 2021-01-02 PROCEDURE — 97166 OT EVAL MOD COMPLEX 45 MIN: CPT

## 2021-01-02 PROCEDURE — APPNB15 APP NON BILLABLE TIME 0-15 MINS: Performed by: NURSE PRACTITIONER

## 2021-01-02 PROCEDURE — 80053 COMPREHEN METABOLIC PANEL: CPT

## 2021-01-02 PROCEDURE — 97530 THERAPEUTIC ACTIVITIES: CPT

## 2021-01-02 PROCEDURE — 6360000002 HC RX W HCPCS: Performed by: STUDENT IN AN ORGANIZED HEALTH CARE EDUCATION/TRAINING PROGRAM

## 2021-01-02 PROCEDURE — 6360000002 HC RX W HCPCS: Performed by: PHYSICIAN ASSISTANT

## 2021-01-02 PROCEDURE — 97116 GAIT TRAINING THERAPY: CPT

## 2021-01-02 PROCEDURE — 73060 X-RAY EXAM OF HUMERUS: CPT

## 2021-01-02 PROCEDURE — 85730 THROMBOPLASTIN TIME PARTIAL: CPT

## 2021-01-02 PROCEDURE — 97535 SELF CARE MNGMENT TRAINING: CPT

## 2021-01-02 PROCEDURE — 73030 X-RAY EXAM OF SHOULDER: CPT

## 2021-01-02 PROCEDURE — 94761 N-INVAS EAR/PLS OXIMETRY MLT: CPT

## 2021-01-02 PROCEDURE — 2000000000 HC ICU R&B

## 2021-01-02 PROCEDURE — 97163 PT EVAL HIGH COMPLEX 45 MIN: CPT

## 2021-01-02 PROCEDURE — 73130 X-RAY EXAM OF HAND: CPT

## 2021-01-02 RX ORDER — OXYCODONE HYDROCHLORIDE 5 MG/1
5 TABLET ORAL EVERY 6 HOURS PRN
Status: DISCONTINUED | OUTPATIENT
Start: 2021-01-02 | End: 2021-01-03

## 2021-01-02 RX ORDER — LANOLIN ALCOHOL/MO/W.PET/CERES
500 CREAM (GRAM) TOPICAL ONCE
Status: COMPLETED | OUTPATIENT
Start: 2021-01-02 | End: 2021-01-02

## 2021-01-02 RX ORDER — FOLIC ACID 1 MG/1
1 TABLET ORAL ONCE
Status: COMPLETED | OUTPATIENT
Start: 2021-01-02 | End: 2021-01-02

## 2021-01-02 RX ORDER — MIDODRINE HYDROCHLORIDE 5 MG/1
10 TABLET ORAL EVERY 8 HOURS
Status: DISCONTINUED | OUTPATIENT
Start: 2021-01-02 | End: 2021-01-06

## 2021-01-02 RX ORDER — GABAPENTIN 300 MG/1
600 CAPSULE ORAL EVERY 8 HOURS
Status: DISCONTINUED | OUTPATIENT
Start: 2021-01-02 | End: 2021-01-04

## 2021-01-02 RX ORDER — DIAZEPAM 5 MG/1
5 TABLET ORAL EVERY 6 HOURS
Status: DISCONTINUED | OUTPATIENT
Start: 2021-01-02 | End: 2021-01-04

## 2021-01-02 RX ADMIN — Medication 500 MG: at 09:57

## 2021-01-02 RX ADMIN — Medication 10 ML: at 09:58

## 2021-01-02 RX ADMIN — GABAPENTIN 300 MG: 300 CAPSULE ORAL at 00:11

## 2021-01-02 RX ADMIN — DEXTROSE MONOHYDRATE 2 G: 50 INJECTION, SOLUTION INTRAVENOUS at 04:21

## 2021-01-02 RX ADMIN — OXYCODONE HYDROCHLORIDE 5 MG: 5 TABLET ORAL at 16:03

## 2021-01-02 RX ADMIN — ACETAMINOPHEN 1000 MG: 650 SOLUTION ORAL at 16:03

## 2021-01-02 RX ADMIN — DIAZEPAM 5 MG: 5 TABLET ORAL at 08:14

## 2021-01-02 RX ADMIN — GABAPENTIN 600 MG: 300 CAPSULE ORAL at 08:14

## 2021-01-02 RX ADMIN — DIAZEPAM 5 MG: 5 TABLET ORAL at 21:21

## 2021-01-02 RX ADMIN — ACETAMINOPHEN 1000 MG: 650 SOLUTION ORAL at 00:09

## 2021-01-02 RX ADMIN — FOLIC ACID 1 MG: 1 TABLET ORAL at 09:58

## 2021-01-02 RX ADMIN — SODIUM CHLORIDE, PRESERVATIVE FREE 10 ML: 5 INJECTION INTRAVENOUS at 09:59

## 2021-01-02 RX ADMIN — DIAZEPAM 5 MG: 5 TABLET ORAL at 13:52

## 2021-01-02 RX ADMIN — ENOXAPARIN SODIUM 30 MG: 30 INJECTION SUBCUTANEOUS at 09:57

## 2021-01-02 RX ADMIN — MIDODRINE HYDROCHLORIDE 10 MG: 5 TABLET ORAL at 09:58

## 2021-01-02 RX ADMIN — ACETAMINOPHEN 1000 MG: 650 SOLUTION ORAL at 08:14

## 2021-01-02 RX ADMIN — FAMOTIDINE 20 MG: 10 INJECTION INTRAVENOUS at 21:23

## 2021-01-02 RX ADMIN — GABAPENTIN 600 MG: 300 CAPSULE ORAL at 16:03

## 2021-01-02 RX ADMIN — DOCUSATE SODIUM 100 MG: 100 CAPSULE, LIQUID FILLED ORAL at 08:14

## 2021-01-02 RX ADMIN — MIDODRINE HYDROCHLORIDE 10 MG: 5 TABLET ORAL at 16:03

## 2021-01-02 RX ADMIN — FAMOTIDINE 20 MG: 10 INJECTION INTRAVENOUS at 08:14

## 2021-01-02 RX ADMIN — FENTANYL CITRATE 50 MCG: 50 INJECTION, SOLUTION INTRAMUSCULAR; INTRAVENOUS at 02:29

## 2021-01-02 RX ADMIN — ENOXAPARIN SODIUM 30 MG: 30 INJECTION SUBCUTANEOUS at 21:23

## 2021-01-02 RX ADMIN — SODIUM CHLORIDE, PRESERVATIVE FREE 10 ML: 5 INJECTION INTRAVENOUS at 21:24

## 2021-01-02 ASSESSMENT — PAIN SCALES - GENERAL
PAINLEVEL_OUTOF10: 4
PAINLEVEL_OUTOF10: 3

## 2021-01-02 ASSESSMENT — PAIN DESCRIPTION - PAIN TYPE: TYPE: ACUTE PAIN

## 2021-01-02 ASSESSMENT — PAIN DESCRIPTION - LOCATION: LOCATION: ARM

## 2021-01-02 NOTE — OP NOTE
[address] [facility] [address]                                OPERATIVE REPORT    PATIENT NAME: Usha Sneed                         :        1975  MED REC NO:   [id number]                             ROOM:       [id number]  ACCOUNT NO:   [de-identified]                           ADMIT DATE: 2021  PROVIDER:     Francis Cruz    DATE OF PROCEDURE:  2021    PREOPERATIVE DIAGNOSIS:  Cervical cord compression secondary to  traumatic disk rupture and fracture, C6-C7. POSTOPERATIVE DIAGNOSIS:  Cervical cord compression secondary to  traumatic disk rupture and fracture, C6-C7. OPERATIVE PROCEDURES:  1. C6-C7 anterior diskectomy and fusion. 2.  Application of interbody fusion device, C6-C7. 3.  Microdissection. SURGEON:  Tasia Wilder. Omid Mcwilliams MD    INDICATION FOR SURGERY:  A patient who fell 30 feet, apparently landing  on his head, complaining of numbness and tingling in his arms,  particularly on the right side. He has got weakness on his right  triceps and right lower extremity, some mild weakness to the left lower  extremity as well. MRI shows a large extruded fragment of disk behind  the body of C6. I note that the radiologist did not comment on that on  the x-ray report, but it was clear there on the MRI as well as at the  time of surgery. ESTIMATED BLOOD LOSS:  Less than 50 mL. DESCRIPTION OF OPERATION:  After an adequate level of endotracheal  anesthesia had been obtained, the patient was prepared and draped in the  usual sterile fashion. I had actually moved him from the stretcher onto  the OR table and obtained a radiograph prior to prepping him prior to  beginning surgery, and the nearest we could tell from the images, he was  still lined up at C6-C7.   A transverse incision was made in the neck and  carried down to the platysmal muscle, which was then opened and the  plane created superficial and deep to the platysma muscle. Tracheoesophageal bundle was retracted towards the left side. The  pre-cervical fascia was identified and opened, and an intraoperative  radiograph was obtained in order to confirm the appropriate level. Longus colli muscles were subperiosteally elevated by C6 and C7. There  was early trauma through the disk space as well as some compression at  the superior aspect of C7. The disk was completely disrupted, and there  was actually not a lot of disk left in the disk space. [place]  distractor _____ were carefully placed through the body of C6 and C7,  and it was gently retracted minimally. The disk space was entered. The  operating microscope was brought onto the field, and the remainder of  the procedure was done through the operating microscope. There were 2  very large completely extruded fragments of disk underlying the body of  C6. These were carefully teased out. Off to the right side was a large  combination of disk and bone spur, which was also compressing the cord. With the use of microdissection, I was able to carefully tease this bone  and disk off the dura. There was a small area in the dura, where the  bone had entered. There was a very small CSF leak from the trauma. Once this was done, the cord and dura appeared to be returned to its  normal position. The posterior longitudinal ligament was widely opened  but had already been torn by the trauma. The endplates were then  prepared _____ filled with Signafuse, placed in the interspace and  countersunk. Lanx plate was attached. I was not really happy with the  initial radiograph. I removed the plate, put a longer plate on, and  then got another set of films and was happy with the construct. Hemostasis was obtained. A small piece of Duragen was placed over the  area where the dura had been disrupted. Incision was then closed in  layers. Sterile dressing was applied.   The patient was awakened in the

## 2021-01-02 NOTE — PROGRESS NOTES
Neurosurgery RONEL/Resident    Daily Progress Note   CC:No chief complaint on file. 1/2/2021  6:05 AM    Chart reviewed. No acute events overnight. No new complaints. Afebrile. POD #1 s/p ACDF C6-7, MAP has been >85 and was started on Levophed for pressor support,turned off this morning and patient was stated on Midodrine. Reports some numbness scattered to body but verbalized mainly to right forearm and some pain to left thigh. In cervical collar, denies chest pain and SOB.  Pain well controlled with current regimen    Vitals:    01/02/21 0315 01/02/21 0330 01/02/21 0345 01/02/21 0400   BP:       Pulse: 51 (!) 49 55 54   Resp: 16 15 15 16   Temp:    98.7 °F (37.1 °C)   TempSrc:    Oral   SpO2: 100% 100% 100% 100%   Weight:       Height:           PE:   AOx3   PERRL, EOMI  Motor   L deltoid 5/5; R deltoid 5/5  L biceps 5/5; R biceps 5/5  L triceps 5/5; R triceps 4/5  L wrist extension 5/5; R wrist extension 4/5  L intrinsics 5/5; R intrinsics 5/5   Left hand grasp stronger than right     L iliopsoas 5/5 , R iliopsoas 5/5  L quadriceps 5/5; R quadriceps 4+/5  L Dorsiflexion 5/5; R dorsiflexion 4+/5  L Plantarflexion 5/5; R plantarflexion 5/5  L EHL 5/5; R EHL 3+/5    Sensation: numbness to right forearm and pain to left thigh    Incision: intact covered with post op dressing       Lab Results   Component Value Date    WBC 16.9 (H) 01/02/2021    HGB 13.6 01/02/2021    HCT 40.9 01/02/2021     01/02/2021    ALT 21 01/02/2021    AST 22 01/02/2021     01/02/2021    K 4.1 01/02/2021     01/02/2021    CREATININE 0.61 (L) 01/02/2021    BUN 10 01/02/2021    CO2 20 01/02/2021    INR 1.0 01/02/2021         A/P  39 y.o. male who presents with cervical cord compression secondary to traumatic disk rupture and fracture of C6-C7  POD #1 s/p anterior cervical discectomy and fusion C6-C7     - PT and OT for mobilization  - Continue MAP protocol currently off Levophed and started on Midodrine   - MAP protocol until

## 2021-01-02 NOTE — PROGRESS NOTES
Trauma Tertiary Survey    Admit Date: 1/1/2021  Hospital day 1    Fall distance 30 feet     No past medical history on file. Scheduled Meds:   diazePAM  5 mg Oral Q6H    gabapentin  600 mg Oral Q8H    midodrine  10 mg Oral Q8H    enoxaparin  30 mg Subcutaneous BID    sodium chloride flush  10 mL Intravenous 2 times per day    famotidine (PEPCID) injection  20 mg Intravenous BID    morphine  4 mg Intravenous Once    sodium chloride flush  10 mL Intravenous 2 times per day    acetaminophen  1,000 mg Oral Q8H    docusate sodium  100 mg Oral Daily     Continuous Infusions:   norepinephrine 1 mcg/min (01/02/21 1124)     PRN Meds:oxyCODONE, sodium chloride flush, polyethylene glycol, ondansetron, sodium chloride flush, magnesium hydroxide, bisacodyl, promethazine **OR** ondansetron, polyethylene glycol    Subjective:     Patient has complaints of right arm burning. Pain is mild, constant  There is associated numbness, weakness of right upper and lower extremity. Objective:     Patient Vitals for the past 8 hrs:   Temp Temp src Pulse Resp SpO2   01/02/21 1100   72 17 98 %   01/02/21 1000   73 25 95 %   01/02/21 0937   75 18 98 %   01/02/21 0900 98.6 °F (37 °C) Oral 58 15 96 %   01/02/21 0800 98.6 °F (37 °C) Oral 66 21 99 %   01/02/21 0400 98.7 °F (37.1 °C) Oral 54 16 100 %       I/O last 3 completed shifts: In: 3301.8 [P.O.:400; I.V.:2901.8]  Out: 0956 [Urine:3600; Blood:20]  I/O this shift:   In: 474.5 [I.V.:474.5]  Out: 400 [Urine:400]      PHYSICAL EXAM:   GCS:  4 - Opens eyes on own   6 - Follows simple motor commands  5 - Alert and oriented    Pupil size:  Left 2 mm Right 2 mm  Pupil reaction: Yes  Wiggles fingers: Left Yes Right Yes  Hand grasp:   Left normal   Right decreased  Wiggles toes: Left Yes    Right Yes  Plantar flexion: Left normal  Right decreased    GENERAL: alert, no distress  NEURO: positive findings: muscular weakness of the right upper and lower extremity, no decreased sensation, reflexes intact bilaterally extremities. Reported paresthesias L upper extremity  HEAD: normocephalic, atraumatic   EYES: sclera clear, pupils equal and reactive to light  ENT: moist mucous membranes   : Soto in place and draining  LUNGS: clear to auscultation bilaterally- no wheezes, rales or rhonchi, normal air movement, no respiratory distress and clear to ausculation, without wheezes, rales or rhonci   HEART: normal rate and regular rhythm  ABDOMEN: soft, non-tender, non-distended, bowel sounds present in all 4 quadrants and no guarding or peritoneal signs present  EXTERMITY: peripheral pulses present no cyanosis, clubbing or edema  SKIN: normal coloration and turgor       Spine:     Spine Tenderness ROM   Cervical collared Not assessed   Thoracic 0 /10 Normal   Lumbar 0 /10 Normal     Musculoskeletal    Joint Tenderness Swelling ROM   Right shoulder absent absent normal   Left shoulder absent absent normal   Right elbow absent absent normal   Left elbow absent absent normal   Right wrist absent absent normal   Left wrist absent absent normal   Right hand grasp absent absent normal   Left hand grasp absent absent normal   Right hip absent absent normal   Left hip absent absent normal   Right knee absent absent normal   Left knee absent absent normal   Right ankle absent absent normal   Left ankle absent absent normal   Right foot absent absent normal   Left foot absent absent normal           CONSULTS: NSG    PROCEDURES: ACDF C6-7    INJURIES:  C6/C7 fx, B/L pulmonary contusions      Patient Active Problem List   Diagnosis    Fall from, out of or through [facility], initial encounter    Fall from [facility]         Assessment/Plan: 1. Neuro/Pain  -MMPT: Tylenol 1g q8, valium 5mg q6, neurontin 600mg q8, fentanyl dc'd  -Radha 5mg q6  -s/p c6/7 discectomy and fusion with CSF leak repair.   -right upper and lower extremity weakness 2/5, consistent with before surgery   -PT/OT today     2. CV  -HR 50-60s, MAP goal>85  -Levo dc'd  -Midodrine 10mg q8     3. Heme  -Hgb 13.6  -Plts 239     4. Pulm  -99% RA  -Incentive spirometry 10x per hour while awake  -Acapella     5. Renal   -Na 139/K 4.1/Cl 106/CO2 20  -Ca 8.5 up from 8.4  -BUN 10/0.61  -d/c valdez catheter     6. GI/FEN  -reg diet  -ensure shakes  -colace qd  -Ciwa: 2     7. ID  -wbc 16.9  -afebrile     8. Endo  -Glucose 125     9. Lines  -PIV, r fem cvc, r fem a line     10. Proph  -lovenox 30mg bid  -pepcid     11.  Dispo  -Stepdown if pressure support maintained with oral medications

## 2021-01-02 NOTE — PROGRESS NOTES
Occupational Therapy   Occupational Therapy Initial Assessment  Date: 2021   Patient Name: Lavaun Severs  MRN: [id number]     : 1975    Date of Service: 2021    Discharge Recommendations:  Patient would benefit from continued therapy after discharge  OT Equipment Recommendations  Other: CTA    Assessment   Performance deficits / Impairments: Decreased functional mobility ; Decreased ADL status; Decreased safe awareness;Decreased cognition;Decreased balance;Decreased high-level IADLs  Assessment: Pt agreeable to OT eval this date. Pt completed bed mobility with CGA. Pt ed on importance of wearing c-collar and restrictions. Pt with c/o R side weakness throughout session however none observed during functional tasks. Pt provided with a pink medium resistance foam and educated on strengthening ex for R hand since pt reports concerns of weakness. Pt demonstrates significant safety awareness deficits during session, limiting session somewhat. Pt educated thoroughly on safety during session. Pt requires Min-Mod A for majority of ADLs at this time and would benefit from continued OT services to address deficits listed. At this time, pt would be unsafe to attempt functional tasks without assistance d/t safety awareness deficits  Prognosis: Good  Decision Making: Medium Complexity  Patient Education: Pt ed on OT role, OT POC, safety awareness, DME use upon d/c, transfer training, c-collar and restrictions, hand strengthening ex, and importance of continued OT. Pt verbalized good understanding  REQUIRES OT FOLLOW UP: Yes  Activity Tolerance  Activity Tolerance: Patient Tolerated treatment well;Treatment limited secondary to decreased cognition  Safety Devices  Safety Devices in place: Yes  Type of devices: Nurse notified; Left in bed;Gait belt;Call light within reach  Restraints  Initially in place: No           Patient Diagnosis(es): The encounter diagnosis was Injury resulting from fall from height. has no past medical history on file. has a past surgical history that includes Cervical disc surgery (01/01/2021). Restrictions  Restrictions/Precautions  Restrictions/Precautions: Fall Risk  Required Braces or Orthoses?: Yes  Required Braces or Orthoses  Cervical: c-collar(Aspen collar at all times except feeding and hygiene)  Position Activity Restriction  Other position/activity restrictions: Activity as tolerated; TLS clear; R groin ART line    Subjective   General  Patient assessed for rehabilitation services?: Yes  Family / Caregiver Present: No  General Comment  Comments: RN ok'd pt for OT eval this date. Pt agreeable to session and motivated however significantly impulsive  Patient Currently in Pain: Denies  Vital Signs  Patient Currently in Pain: Denies     Social/Functional History  Social/Functional History  Lives With: Alone  Type of Home: Apartment  Home Layout: One level  Home Access: Stairs to enter with rails  Entrance Stairs - Number of Steps: ~30  Entrance Stairs - Rails: Both  Bathroom Shower/Tub: Tub/Shower unit  Bathroom Toilet: Standard  Home Equipment: (no DME use at baseline)  ADL Assistance: [address] [place] [address] Avenue: Independent  Homemaking Responsibilities: Yes  Ambulation Assistance: Independent  Transfer Assistance: Independent  Active : Yes  Mode of Transportation: Truck  Occupation: Part time employment  Type of occupation: ; working M-W  Leisure & Hobbies: play [employer]       Objective   Vision: Impaired  Vision Exceptions: Wears glasses for reading  Hearing: Within functional limits    Orientation  Overall Orientation Status: Within Functional Limits  Observation/Palpation  Observation: Cervical collar on.   Has art line  Balance Attention Span: Attends with cues to redirect  Safety Judgement: Decreased awareness of need for assistance;Decreased awareness of need for safety  Problem Solving: Decreased awareness of errors  Insights: Decreased awareness of deficits  Initiation: Requires cues for some  Sequencing: Requires cues for some       Sensation  Overall Sensation Status: Impaired(Pt reports \"all over my body\")        LUE AROM (degrees)  LUE AROM : WFL  LUE General AROM: shoulder to 90 degree flexion only d/t c-spine prec  Left Hand AROM (degrees)  Left Hand AROM: WFL  RUE AROM (degrees)  RUE AROM : WFL  RUE General AROM: shoulder to 90 degree flexion only d/t c-spine prec  Right Hand AROM (degrees)  Right Hand AROM: WFL  LUE Strength  Gross LUE Strength: WFL  L Hand General: 4+/5  RUE Strength  Gross RUE Strength: WFL  R Hand General: 4+/5  RUE Strength Comment: when formally tested, pt demo mild strength deficits however when distracted with a task pt demo strength that is [facility]                   Plan   Plan  Times per week: 4-5 x/wk  Current Treatment Recommendations: Strengthening, Balance Training, Functional Mobility Training, Endurance Training, Cognitive Reorientation, Safety Education & Training, Patient/Caregiver Education & Training, Equipment Evaluation, Education, & procurement, Self-Care / ADL, Home Management Training    AM-PAC Score        AM-[facility] Inpatient Daily Activity Raw Score: 17 (01/02/21 1411)  AM-PAC Inpatient ADL T-Scale Score : 37.26 (01/02/21 1411)  ADL Inpatient CMS 0-100% Score: 50.11 (01/02/21 1411)  ADL Inpatient CMS G-Code Modifier : CK (01/02/21 1411)    Goals  Short term goals  Time Frame for Short term goals: By discharge, pt will:  Short term goal 1: Demo Min A for functional transfers and functional mobility with use of LRD PRN  Short term goal 2: Demo I with setup for UB ADLs and grooming tasks  Short term goal 3: Demo CGA with setup and AE PRN for LB ADLs and toileting tasks Short term goal 4: Demo 8+ minutes dynamic standing task to increase balance for ADL completion  Short term goal 5: Demo good safety awareness throughout therapy session with <4 VCs       Therapy Time   Individual Concurrent Group Co-treatment   Time In 0829         Time Out 0909         Minutes 40         Timed Code Treatment Minutes: 7[address] Dese[address] Victoria OTR/L

## 2021-01-02 NOTE — PROGRESS NOTES
ICU PROGRESS NOTE      PATIENT NAME: Michelle Velasquez  MEDICAL RECORD NO. [id number]  DATE: 2021    PRIMARY CARE PHYSICIAN: Cici Vazquez DO    HD: # 1    ASSESSMENT    Patient Active Problem List   Diagnosis    Fall from, out of or through [facility], initial encounter    Fall from [facility]    1. Neuro/Pain  -MMPT: Tylenol 1g q8, valium 5mg q6, neurontin 600mg q8, fentanyl dc'd  -Radha 5mg q6  -s/p c6/7 discectomy and fusion with CSF leak repair. -right upper and lower extremity weakness 2/5, consistent with before surgery   -PT/OT today    2. CV  -HR 50-60s, MAP goal>85  -Levo dc'd  -Midodrine 10mg q8    3. Heme  -Hgb 13.6  -Plts 239    4. Pulm  -99% RA  -Incentive spirometry 10x per hour while awake  -Acapella    5. Renal   -Na 139/K 4.1/Cl 106/CO2 20  -Ca 8.5 up from 8.4  -BUN 10/0.61  -d/c valdez catheter    6. GI/FEN  -reg diet  -ensure shakes  -colace qd  -Ciwa: 2    7. ID  -wbc 16.9  -afebrile    8. Endo  -Glucose 125    9. Lines  -PIV, r fem cvc, r fem a line    10. Proph  -lovenox 30mg bid  -pepcid    11. Dispo  -Stepdown if pressure support maintained with oral medications      CHECKLIST    RASS: -1+1  RESTRAINTS: None  IVF: Transition to oral  NUTRITION: regular diet  ANTIBIOTICS: none  GI: pepcid  DVT: lovenox  GLYCEMIC CONTROL: bg 125  HOB >45: not required    SUBJECTIVE    Michelle Velasquez  Is a 38 yo male who fell 30' over a balcon while intoxicated. C6/7 fx with r upper and lower extremity weakness, bilateral pulmonary contusions. Today, voiced interest in beginning physical therapy. Pain improving. Desire to ambulate. Reports deep burning sensation to the R forearm. Pt concerned about fx.      OBJECTIVE  VITALS: Temp: Temp: 98.7 °F (37.1 °C)Temp  Av.7 °F (37.1 °C)  Min: 98 °F (36.7 °C)  Max: 99.7 °F (84.5 °C) BP Systolic (11QFY), QVW:046 , Min:71 , IPZ:985   Diastolic (55IKY), LOM:57, Min:30, Max:134   Pulse Pulse  Av.6  Min: 48  Max: 81 Resp Resp  Avg: 14.8  Min: 0  Max: 25 Pulse ox SpO2  Av.3 %  Min: 86 %  Max: 100 %    GENERAL: alert, no distress  NEURO: positive findings: muscular weakness of the right upper and lower extremity, no decreased sensation, reflexes intact bilaterally extremities.  Reported paresthesias L upper extremity  HEAD: normocephalic, atraumatic   EYES: sclera clear, pupils equal and reactive to light  ENT: moist mucous membranes   : Soto in place and draining  LUNGS: clear to auscultation bilaterally- no wheezes, rales or rhonchi, normal air movement, no respiratory distress and clear to ausculation, without wheezes, rales or rhonci   HEART: normal rate and regular rhythm  ABDOMEN: soft, non-tender, non-distended, bowel sounds present in all 4 quadrants and no guarding or peritoneal signs present  EXTERMITY: peripheral pulses present no cyanosis, clubbing or edema  SKIN: normal coloration and turgor       Drain/tube output:  3600, urine    LAB:  CBC:   Recent Labs     21  0559 21  1728 21  0436   WBC 21.0* 12.1* 16.9*   HGB 14.3 14.3 13.6   HCT 43.5 43.6 40.9   MCV 98.4 96.5 96.5    233 239     BMP:   Recent Labs     21  0559 21  1728 21  0436    139 139   K 4.0 4.1 4.1    106 106   CO2 22 21 20   BUN 13 12 10   CREATININE 0.73 0.72 0.61*   GLUCOSE 86 94 125*         RADIOLOGY:  L upper ext: no fxs      Sotero Mckeon DO  21, 6:44 AM

## 2021-01-02 NOTE — PROGRESS NOTES
Physical Therapy    Facility/Department: [address]  Initial Assessment    NAME: Vicki Ramirez  : 1975  MRN: [id number]    Date of Service: 2021    Discharge Recommendations:  Patient would benefit from continued therapy after discharge        Assessment   Body structures, Functions, Activity limitations: Decreased functional mobility ; Decreased endurance;Decreased strength;Decreased safe awareness;Decreased balance  Assessment: Fluctuating weakness in LEs. Unable to lift his legs for nurse to place pillows under them, but he was able to lift them to sit up with SBA. Impulsive, but able to follow instructions with le cues to follow staff's lead. Able to ambulate in hallway with two staff and a chair follow for his impulsiveness. Will treat for strength and mobility. Prognosis: Good  Decision Making: High Complexity  Clinical Presentation: unstable  PT Education: Functional Mobility Training;Plan of Care;General Safety; Injury Prevention;Gait Training;Equipment;Transfer Training;Goals  REQUIRES PT FOLLOW UP: Yes  Activity Tolerance  Activity Tolerance: Patient limited by fatigue;Patient limited by endurance       Patient Diagnosis(es): The encounter diagnosis was Injury resulting from fall from height.     has no past medical history on file. has a past surgical history that includes Cervical disc surgery (2021). Restrictions  Position Activity Restriction  Other position/activity restrictions: Activity as tolerated          Subjective  General  Chart Reviewed: Yes  Patient assessed for rehabilitation services?: Yes  Family / Caregiver Present: Yes(Girlfriend present retirement through eval)  Follows Commands: Within Functional Limits  Other (Comment): He needed several le cautions to follow therapist's instructions exactly instead of doing what he thought was better.   Pain Screening  Patient Currently in Pain: Denies  Vital Signs  Patient Currently in Pain: Denies       Orientation Orientation  Overall Orientation Status: Within Functional Limits  Social/Functional History  Social/Functional History  Lives With: Alone  Type of Home: Apartment  Home Layout: One level  Home Access: Stairs to enter with rails  Entrance Stairs - Number of Steps: ~30  Entrance Stairs - Rails: Both  Bathroom Shower/Tub: Tub/Shower unit  Bathroom Toilet: Standard  Home Equipment: (no DME use at baseline)  ADL Assistance: [address]: Independent  Homemaking Responsibilities: Yes  Ambulation Assistance: Independent  Transfer Assistance: Independent  Active : Yes  Mode of Transportation: Truck  Occupation: Part time employment  Type of occupation: ; working M-W  Leisure & Hobbies: play [employer]  Cognition   Cognition  Overall Cognitive Status: Exceptions  Following Commands: Inconsistently follows commands; Follows one step commands with repetition  Attention Span: Attends with cues to redirect  Safety Judgement: Decreased awareness of need for assistance;Decreased awareness of need for safety    Objective     Observation/Palpation  Observation: Cervical collar on. Has art line       Strength RLE  Comment: Hip 3-/5, hip ext 3-/5, ankle DF 0/5, PF 3+/5  Strength LLE  Comment: Hip 3+/5, hip ext 4/5, ankle 4/5        Bed mobility  Supine to Sit: Stand by assistance  Transfers  Sit to Stand: Moderate Assistance;2 Person Assistance  Stand to sit: Contact guard assistance(Sat multiple times without warning to take a rest. Sat in chair without reaching back. Cued that he must follow instructions.)  Ambulation  Ambulation?: Yes  Ambulation 1  Surface: level tile  Device: Rolling Walker  Assistance: Minimal assistance;2 Person assistance  Quality of Gait: Impulsive, takes overly long strides while unsteady, positioning himself too far anterior in walker.   Gait Deviations: [place] Collar  Distance: 76' Comments: Poor coordination of right swing, makes initial contact too close to left foot. Stepped on his own foot x1     Balance  Sitting - Static: Poor;+  Sitting - Dynamic: Poor  Standing - Static: Poor;+  Standing - Dynamic: Poor  Comments: Unsteady in sitting. Needed cues to sit statically and focus on keeping his balance. Plan   Plan  Times per week: 5-6x/wk  Current Treatment Recommendations: Gait Training, Strengthening, Stair training, Balance Training, Functional Mobility Training, Endurance Training, Transfer Training, Safety Education & Training, Patient/Caregiver Education & Training  Safety Devices  Type of devices: Left in chair, Call light within reach, All fall risk precautions in place, Gait belt, Nurse notified           AM-PAC Score  AM-PAC Inpatient Mobility Raw Score : 16 (01/02/21 1314)  AM-PAC Inpatient T-Scale Score : 40.78 (01/02/21 1314)  Mobility Inpatient CMS 0-100% Score: 54.16 (01/02/21 1314)  Mobility Inpatient CMS G-Code Modifier : CK (01/02/21 1314)          Goals  Short term goals  Time Frame for Short term goals: 14 visits  Short term goal 1: Ambulate 100' with walker SBA. Short term goal 2: Stand without UE support with CGA for 1 minute. Short term goal 3: Sit to/from stand with SBA. Patient Goals   Patient goals : Walk again. Therapy Time   Individual Concurrent Group Co-treatment   Time In 1145         Time Out 1220         Minutes 35         Timed Code Treatment Minutes: 25 Minutes       Maryjane Urias, PT    Addendum to PT eval:  Patient is requesting green theraband to strengthen his UEs in bed. Due to cervical injury/surgery, neurosurgery team was messaged by writer to ask if theraband use is permissible. Per Arcelia Campbell with neurosurgery, patient is ok'd to use theraband while here in hospital.  Amalia Dolan theraband was issued to patient. Nurse aware.   PBrosius PT

## 2021-01-03 PROBLEM — S12.600A C7 CERVICAL FRACTURE (HCC): Status: ACTIVE | Noted: 2021-01-03

## 2021-01-03 LAB
-: NORMAL
ABSOLUTE EOS #: 0.07 K/UL (ref 0–0.44)
ABSOLUTE IMMATURE GRANULOCYTE: 0.05 K/UL (ref 0–0.3)
ABSOLUTE LYMPH #: 4.19 K/UL (ref 1.1–3.7)
ABSOLUTE MONO #: 1.2 K/UL (ref 0.1–1.2)
ANION GAP SERPL CALCULATED.3IONS-SCNC: 12 MMOL/L (ref 9–17)
BASOPHILS # BLD: 0 % (ref 0–2)
BASOPHILS ABSOLUTE: 0.05 K/UL (ref 0–0.2)
BUN BLDV-MCNC: 13 MG/DL (ref 6–20)
BUN/CREAT BLD: ABNORMAL (ref 9–20)
CALCIUM SERPL-MCNC: 8 MG/DL (ref 8.6–10.4)
CHLORIDE BLD-SCNC: 105 MMOL/L (ref 98–107)
CO2: 22 MMOL/L (ref 20–31)
CREAT SERPL-MCNC: 0.7 MG/DL (ref 0.7–1.2)
DIFFERENTIAL TYPE: ABNORMAL
EKG ATRIAL RATE: 74 BPM
EKG P AXIS: 73 DEGREES
EKG P-R INTERVAL: 120 MS
EKG Q-T INTERVAL: 386 MS
EKG QRS DURATION: 90 MS
EKG QTC CALCULATION (BAZETT): 428 MS
EKG R AXIS: 8 DEGREES
EKG T AXIS: 29 DEGREES
EKG VENTRICULAR RATE: 74 BPM
EOSINOPHILS RELATIVE PERCENT: 1 % (ref 1–4)
GFR AFRICAN AMERICAN: >60 ML/MIN
GFR NON-AFRICAN AMERICAN: >60 ML/MIN
GFR SERPL CREATININE-BSD FRML MDRD: ABNORMAL ML/MIN/{1.73_M2}
GFR SERPL CREATININE-BSD FRML MDRD: ABNORMAL ML/MIN/{1.73_M2}
GLUCOSE BLD-MCNC: 108 MG/DL (ref 70–99)
HCT VFR BLD CALC: 39.6 % (ref 40.7–50.3)
HEMOGLOBIN: 13 G/DL (ref 13–17)
IMMATURE GRANULOCYTES: 0 %
LYMPHOCYTES # BLD: 32 % (ref 24–43)
MCH RBC QN AUTO: 31.6 PG (ref 25.2–33.5)
MCHC RBC AUTO-ENTMCNC: 32.8 G/DL (ref 28.4–34.8)
MCV RBC AUTO: 96.4 FL (ref 82.6–102.9)
MONOCYTES # BLD: 9 % (ref 3–12)
NRBC AUTOMATED: 0 PER 100 WBC
PDW BLD-RTO: 12.5 % (ref 11.8–14.4)
PLATELET # BLD: 248 K/UL (ref 138–453)
PLATELET ESTIMATE: ABNORMAL
PMV BLD AUTO: 9.7 FL (ref 8.1–13.5)
POTASSIUM SERPL-SCNC: 3.9 MMOL/L (ref 3.7–5.3)
RBC # BLD: 4.11 M/UL (ref 4.21–5.77)
RBC # BLD: ABNORMAL 10*6/UL
REASON FOR REJECTION: NORMAL
SEG NEUTROPHILS: 57 % (ref 36–65)
SEGMENTED NEUTROPHILS ABSOLUTE COUNT: 7.51 K/UL (ref 1.5–8.1)
SODIUM BLD-SCNC: 139 MMOL/L (ref 135–144)
WBC # BLD: 13.1 K/UL (ref 3.5–11.3)
WBC # BLD: ABNORMAL 10*3/UL
ZZ NTE CLEAN UP: ORDERED TEST: NORMAL
ZZ NTE WITH NAME CLEAN UP: SPECIMEN SOURCE: NORMAL

## 2021-01-03 PROCEDURE — 97535 SELF CARE MNGMENT TRAINING: CPT

## 2021-01-03 PROCEDURE — 6370000000 HC RX 637 (ALT 250 FOR IP): Performed by: STUDENT IN AN ORGANIZED HEALTH CARE EDUCATION/TRAINING PROGRAM

## 2021-01-03 PROCEDURE — 2500000003 HC RX 250 WO HCPCS: Performed by: PHYSICIAN ASSISTANT

## 2021-01-03 PROCEDURE — 93010 ELECTROCARDIOGRAM REPORT: CPT | Performed by: INTERNAL MEDICINE

## 2021-01-03 PROCEDURE — 6370000000 HC RX 637 (ALT 250 FOR IP): Performed by: GENERAL PRACTICE

## 2021-01-03 PROCEDURE — 97116 GAIT TRAINING THERAPY: CPT

## 2021-01-03 PROCEDURE — 97110 THERAPEUTIC EXERCISES: CPT

## 2021-01-03 PROCEDURE — 2580000003 HC RX 258: Performed by: PHYSICIAN ASSISTANT

## 2021-01-03 PROCEDURE — 97530 THERAPEUTIC ACTIVITIES: CPT

## 2021-01-03 PROCEDURE — 6360000002 HC RX W HCPCS: Performed by: STUDENT IN AN ORGANIZED HEALTH CARE EDUCATION/TRAINING PROGRAM

## 2021-01-03 PROCEDURE — 85025 COMPLETE CBC W/AUTO DIFF WBC: CPT

## 2021-01-03 PROCEDURE — 2000000000 HC ICU R&B

## 2021-01-03 PROCEDURE — 80048 BASIC METABOLIC PNL TOTAL CA: CPT

## 2021-01-03 PROCEDURE — APPNB15 APP NON BILLABLE TIME 0-15 MINS: Performed by: NURSE PRACTITIONER

## 2021-01-03 RX ORDER — ACETAMINOPHEN 160 MG/5ML
1000 SOLUTION ORAL EVERY 8 HOURS
Status: DISCONTINUED | OUTPATIENT
Start: 2021-01-03 | End: 2021-01-04

## 2021-01-03 RX ORDER — POTASSIUM CHLORIDE 20 MEQ/1
20 TABLET, EXTENDED RELEASE ORAL ONCE
Status: COMPLETED | OUTPATIENT
Start: 2021-01-03 | End: 2021-01-03

## 2021-01-03 RX ORDER — IBUPROFEN 400 MG/1
400 TABLET ORAL EVERY 4 HOURS
Status: DISCONTINUED | OUTPATIENT
Start: 2021-01-03 | End: 2021-01-03

## 2021-01-03 RX ORDER — HYDROCODONE BITARTRATE AND ACETAMINOPHEN 5; 325 MG/1; MG/1
1 TABLET ORAL EVERY 6 HOURS PRN
Status: DISCONTINUED | OUTPATIENT
Start: 2021-01-03 | End: 2021-01-03

## 2021-01-03 RX ORDER — IBUPROFEN 400 MG/1
400 TABLET ORAL
Status: DISCONTINUED | OUTPATIENT
Start: 2021-01-03 | End: 2021-01-09

## 2021-01-03 RX ORDER — ACETAMINOPHEN 160 MG/5ML
1000 SOLUTION ORAL EVERY 12 HOURS
Status: DISCONTINUED | OUTPATIENT
Start: 2021-01-03 | End: 2021-01-03

## 2021-01-03 RX ORDER — OXYCODONE HCL 5 MG/5 ML
5 SOLUTION, ORAL ORAL EVERY 4 HOURS PRN
Status: DISCONTINUED | OUTPATIENT
Start: 2021-01-03 | End: 2021-01-04

## 2021-01-03 RX ADMIN — ENOXAPARIN SODIUM 30 MG: 30 INJECTION SUBCUTANEOUS at 22:11

## 2021-01-03 RX ADMIN — SODIUM CHLORIDE, PRESERVATIVE FREE 10 ML: 5 INJECTION INTRAVENOUS at 23:16

## 2021-01-03 RX ADMIN — DIAZEPAM 5 MG: 5 TABLET ORAL at 17:00

## 2021-01-03 RX ADMIN — HYDROCODONE BITARTRATE AND ACETAMINOPHEN 1 TABLET: 5; 325 TABLET ORAL at 07:55

## 2021-01-03 RX ADMIN — DIAZEPAM 5 MG: 5 TABLET ORAL at 22:11

## 2021-01-03 RX ADMIN — GABAPENTIN 600 MG: 300 CAPSULE ORAL at 17:00

## 2021-01-03 RX ADMIN — GABAPENTIN 600 MG: 300 CAPSULE ORAL at 07:57

## 2021-01-03 RX ADMIN — ACETAMINOPHEN 1000 MG: 650 SOLUTION ORAL at 17:00

## 2021-01-03 RX ADMIN — MIDODRINE HYDROCHLORIDE 10 MG: 5 TABLET ORAL at 17:00

## 2021-01-03 RX ADMIN — SODIUM CHLORIDE, PRESERVATIVE FREE 10 ML: 5 INJECTION INTRAVENOUS at 07:57

## 2021-01-03 RX ADMIN — GABAPENTIN 600 MG: 300 CAPSULE ORAL at 00:32

## 2021-01-03 RX ADMIN — POTASSIUM CHLORIDE 20 MEQ: 1500 TABLET, EXTENDED RELEASE ORAL at 07:57

## 2021-01-03 RX ADMIN — FAMOTIDINE 20 MG: 10 INJECTION INTRAVENOUS at 07:59

## 2021-01-03 RX ADMIN — MIDODRINE HYDROCHLORIDE 10 MG: 5 TABLET ORAL at 23:15

## 2021-01-03 RX ADMIN — DIAZEPAM 5 MG: 5 TABLET ORAL at 07:57

## 2021-01-03 RX ADMIN — ACETAMINOPHEN 1000 MG: 650 SOLUTION ORAL at 07:57

## 2021-01-03 RX ADMIN — IBUPROFEN 400 MG: 400 TABLET, FILM COATED ORAL at 17:00

## 2021-01-03 RX ADMIN — HYDROCODONE BITARTRATE AND ACETAMINOPHEN 1 TABLET: 5; 325 TABLET ORAL at 00:31

## 2021-01-03 RX ADMIN — DOCUSATE SODIUM 100 MG: 100 CAPSULE, LIQUID FILLED ORAL at 07:57

## 2021-01-03 RX ADMIN — ENOXAPARIN SODIUM 30 MG: 30 INJECTION SUBCUTANEOUS at 07:57

## 2021-01-03 RX ADMIN — MIDODRINE HYDROCHLORIDE 10 MG: 5 TABLET ORAL at 07:57

## 2021-01-03 RX ADMIN — MIDODRINE HYDROCHLORIDE 10 MG: 5 TABLET ORAL at 00:33

## 2021-01-03 ASSESSMENT — PAIN SCALES - GENERAL
PAINLEVEL_OUTOF10: 3
PAINLEVEL_OUTOF10: 7

## 2021-01-03 ASSESSMENT — PAIN DESCRIPTION - ORIENTATION
ORIENTATION: LEFT;RIGHT
ORIENTATION: LEFT

## 2021-01-03 ASSESSMENT — PAIN DESCRIPTION - PAIN TYPE
TYPE: ACUTE PAIN
TYPE: ACUTE PAIN

## 2021-01-03 NOTE — PROGRESS NOTES
Physical Therapy  Facility/Department: [address] SICU  Daily Treatment Note  NAME: Chencho Tejada  : 1975  MRN: [id number]    Date of Service: 1/3/2021    Discharge Recommendations:  Patient would benefit from continued therapy after discharge   PT Equipment Recommendations  Other: CTA    Assessment   Body structures, Functions, Activity limitations: Decreased functional mobility ; Decreased endurance;Decreased strength;Decreased safe awareness;Decreased balance  Assessment: Pt ambulated ~250 ft with RW, Roya and wheelchair follow for safety. Pt is unsteady and unsafe to return to prior living arrangements. Pt is limited by R side weakness, decreased safety awareness and decreased functional mobility. would benefit from continued therapy after d/c  Prognosis: Good  REQUIRES PT FOLLOW UP: Yes  Activity Tolerance  Activity Tolerance: Patient limited by fatigue;Patient limited by endurance     Patient Diagnosis(es): The encounter diagnosis was Injury resulting from fall from height.     has no past medical history on file. has a past surgical history that includes Cervical disc surgery (2021). Restrictions  Restrictions/Precautions  Restrictions/Precautions: Fall Risk  Required Braces or Orthoses?: Yes  Required Braces or Orthoses  Cervical: c-collar(aspen collar at all times except feeding and grooming)  Position Activity Restriction  Other position/activity restrictions: Activity as tolerated; TLS clear; R groin ART line  Subjective   General  Chart Reviewed: Yes  Family / Caregiver Present: Yes  Subjective  Subjective: Pt in chair upon arrival. RN and pt agreeable to PT  General Comment  Comments: Pt retired in [address] [place] [address] with call light in reach.  RN notified  Pain Screening  Patient Currently in Pain: Yes  Pain Assessment  Pain Assessment: 0-10  Pain Level: 3  Pain Type: Acute pain  Pain Location: Arm  Pain Orientation: Left;Right  Vital Signs  Patient Currently in Pain: Yes       Orientation  Orientation Overall Orientation Status: Within Functional Limits  Cognition      Objective   Bed mobility  Comment: in chair  Transfers  Sit to Stand: Stand by assistance  Stand to sit: Contact guard assistance  Comment: Pt stood without warning without assistance and without AD  Ambulation  Ambulation?: Yes  Ambulation 1  Surface: level tile  Device: Rolling Walker  Other Apparatus: Wheelchair follow  Assistance: Minimal assistance  Quality of Gait: slightly impulsive, ataxic gait. cues to focus on RLE clearance t/o  Distance: ~250 ft     Balance  Posture: Good  Sitting - Static: Good;-  Sitting - Dynamic: Good;-  Standing - Static: Fair;+  Standing - Dynamic: Fair  Comments: standing balance assessed with RW  Exercises  Seated LE exercise program: Long Arc Quads, hip abduction/adduction, heel/toe raises, and marches. Reps: x10 RLE limited ROM  Comments: . RLE limited ROM   Goals  Short term goals  Time Frame for Short term goals: 14 visits  Short term goal 1: Ambulate 100' with walker SBA. Short term goal 2: Stand without UE support with CGA for 1 minute. Short term goal 3: Sit to/from stand with SBA. Patient Goals   Patient goals : Walk again.     Plan    Plan  Times per week: 5-6x/wk  Current Treatment Recommendations: Gait Training, Strengthening, Stair training, Balance Training, Functional Mobility Training, Endurance Training, Transfer Training, Safety Education & Training, Patient/Caregiver Education & Training  Safety Devices  Type of devices: Left in chair, Call light within reach, All fall risk precautions in place, Gait belt, Nurse notified     Therapy Time   Individual Concurrent Group Co-treatment   Time In 1459         Time Out 1522         Minutes 23         Timed Code Treatment Minutes: [address], PTA

## 2021-01-03 NOTE — PROGRESS NOTES
Occupational Therapy  Facility/Department: [address] S[facility]  Daily Treatment Note  NAME: Sonia Bronson  : 1975  MRN: [id number]    Date of Service: 1/3/2021    Discharge Recommendations:  Further therapy recommended at discharge. The patient should be able to tolerate at least three hours of therapy per day over 5 days or 15 hours over 7 days. Pt impulsive at increased risk for falls however extremely motivated. Assessment   Performance deficits / Impairments: Decreased functional mobility ; Decreased ADL status; Decreased safe awareness;Decreased cognition;Decreased balance;Decreased high-level IADLs;Decreased endurance;Decreased strength;Decreased fine motor control;Decreased coordination  Prognosis: Good  OT Education: OT Role;Transfer Training;Precautions  Patient Education: purpose of activity; proper hand and foot placement; safety precautions; FMC/ activities  Barriers to Learning: pt demo F carry over  REQUIRES OT FOLLOW UP: Yes  Activity Tolerance  Activity Tolerance: Patient Tolerated treatment well;Patient limited by fatigue  Safety Devices  Safety Devices in place: Yes  Type of devices: Gait belt;Patient at risk for falls; Left in chair;Chair alarm in place;Call light within reach;Nurse notified         Patient Diagnosis(es): The encounter diagnosis was Injury resulting from fall from height.      has no past medical history on file. has a past surgical history that includes Cervical disc surgery (2021). Restrictions  Restrictions/Precautions  Restrictions/Precautions: Fall Risk  Required Braces or Orthoses?: Yes  Required Braces or Orthoses  Cervical: c-collar(aspen collar at all times except feeding and grooming)  Position Activity Restriction  Other position/activity restrictions:  Activity as tolerated; TLS clear; R groin ART line  Subjective   General  Chart Reviewed: Yes  Patient assessed for rehabilitation services?: Yes  Family / Caregiver Present: No  General Comment Comments: Pt motivated  Pain Assessment  Pain Assessment: 0-10  Pain Level: 7  Pain Type: Acute pain  Pain Location: Arm  Pain Orientation: Left;Right  Pain Radiating Towards: fingers to elbows  Non-Pharmaceutical Pain Intervention(s): Distraction;Repositioned; Therapeutic presence  Vital Signs  Patient Currently in Pain: Yes   Orientation  Orientation  Overall Orientation Status: Within Functional Limits  Objective    ADL  Feeding: Minimal assistance;Setup;Verbal cueing; Increased time to complete; Adaptive utensils (comment)(pt req assist to open containers; provided with built up utensils)  Grooming: Stand by assistance;Setup; Increased time to complete(oral care and face washing completed seated at sink pt reqe increased time for utensil management)  Additional Comments: Pt extremely motivated throughout session however impuslive with decreased safety awareness. Pt req increased time for ADLs sec to increased fatigue and decreased strength. Throughout session pt c/o weakness in RUE with approx 4-5 LOG noted. [facility] and grasping activites provided to pt, education provided on techniques and importance of cont activity  Balance  Sitting Balance: Stand by assistance(Pt seated by self on EOB at start of session however 1 anterior LOB noted with dynamic sitting)  Standing Balance: Moderate assistance  Standing Balance  Time: Pt tolerated approx 2-3 min  Activity: static standing  Comment: utilizing RW pt unsteady req increased assist for balance maintaince  Functional Mobility  Functional - Mobility Device: Rolling Walker  Activity: To/from bathroom  Assist Level: Minimal assistance  Functional Mobility Comments: EOB>sink>chair req 1 seated rest break sec to fatigue with increased difficulty noted to lift RLE  Bed mobility  Comment: Pt seated at EOB at start of session  Transfers  Stand Step Transfers: Minimal assistance  Sit to stand:  Moderate assistance  Stand to sit: Minimal assistance Transfer Comments: utilizing RW pt req increased assist for balance initiation  Cognition  Overall Cognitive Status: Exceptions  Following Commands: Follows multistep commands with repitition; Follows multistep commands with increased time  Attention Span: Attends with cues to redirect  Safety Judgement: Decreased awareness of need for assistance;Decreased awareness of need for safety  Problem Solving: Decreased awareness of errors  Insights: Decreased awareness of deficits  Initiation: Requires cues for some  Sequencing: Requires cues for some  Cognition Comment: pt impulsive at increased risk for falls     Pt is particular on activities req redirection throughout session. ADL tasks of grooming completed seated at sink see above for LOF. Approx 2-3 LOB noted throughout session pt req assist to correct. Increased difficulty noted with RUE and RLE. At session end pt seated in chair with call light in reach, chair alarm on and RN notified.    Plan   Plan  Times per week: 4-5 x/wk  Current Treatment Recommendations: Strengthening, Balance Training, Functional Mobility Training, Endurance Training, Cognitive Reorientation, Safety Education & Training, Patient/Caregiver Education & Training, Equipment Evaluation, Education, & procurement, Self-Care / ADL, Home Management Training   Cont POC    Goals  Short term goals  Time Frame for Short term goals: By discharge, pt will:  Short term goal 1: Demo Min A for functional transfers and functional mobility with use of LRD PRN  Short term goal 2: Demo I with setup for UB ADLs and grooming tasks  Short term goal 3: Demo CGA with setup and AE PRN for LB ADLs and toileting tasks  Short term goal 4: Demo 8+ minutes dynamic standing task to increase balance for ADL completion  Short term goal 5: Demo good safety awareness throughout therapy session with <4 VCs       Therapy Time   Individual Concurrent Group Co-treatment   Time In [id number]         Time Out 0917         Minutes 54 Timed Code Treatment Minutes: Eric[place] 4, WHELAN/L

## 2021-01-03 NOTE — PROGRESS NOTES
ICU PROGRESS NOTE      PATIENT NAME: Jacky Fierro  MEDICAL RECORD NO. [id number]  DATE: 1/3/2021    PRIMARY CARE PHYSICIAN: Matthew Boyle,     HD: # 2    ASSESSMENT    Patient Active Problem List   Diagnosis    Fall from, out of or through [facility], initial encounter    Fall from [facility]    1. Neuro/Pain  -MMPT: Tylenol 1g q12, valium 5mg q6, neurontin 600mg q8, fentanyl dc'd  -Radha changed to norco per patient request with adjustment of acetaminophen   -s/p c6/7 discectomy and fusion with CSF leak repair. -right upper and lower extremity weakness 2/5, consistent with before surgery   -PT/OT    2. CV  -HR 50-80, MAP goal>85  -Levo 2.5  -Midodrine 10mg q8    3. Heme  -Hgb 13.0  -Plts 248    4. Pulm  -99% RA  -Incentive spirometry 10x per hour while awake  -Acapella    5. Renal   -Na 139/K 3.9/Cl 105/CO2 22  -replace k  -Ca 8.0, ionized pending  -BUN 10/0.61  -urinating independently, without need for straight cath    6. GI/FEN  -reg diet  -ensure shakes  -colace qd  -Ciwa: 2, received     7. ID  -wbc 13.1  -afebrile    8. Endo  -Glucose 108    9. Lines  -PIV, r fem cvc, r fem a line    10. Proph  -lovenox 30mg bid  -pepcid    11. Dispo  -ICU for MAP goal      CHECKLIST    RASS: -1+1  RESTRAINTS: None  IVF: PO  NUTRITION: regular diet  ANTIBIOTICS: none  GI: pepcid  DVT: lovenox  GLYCEMIC CONTROL: bg 108  HOB >45: not required    SUBJECTIVE    Jacky Fierro  Is a 38 yo male who fell 30' over a balcony while intoxicated. C6/7 fx with r upper and lower extremity weakness, bilateral pulmonary contusions. Enjoying PT. Pain improving. Desire to ambulate. Reports deep burning sensation to the R forearm. Frequent exercise band use. OBJECTIVE  VITALS: Temp: Temp: 98.1 °F (36.7 °C)Temp  Av.4 °F (36.9 °C)  Min: 97.9 °F (36.6 °C)  Max: 98.8 °F (37.1 °C) BP No data recorded. No data recorded.    Pulse Pulse  Av.6  Min: 52  Max: 85 Resp Resp  Av.8  Min: 15  Max: 25

## 2021-01-04 LAB
ABSOLUTE EOS #: 0.23 K/UL (ref 0–0.44)
ABSOLUTE IMMATURE GRANULOCYTE: 0.03 K/UL (ref 0–0.3)
ABSOLUTE LYMPH #: 3.07 K/UL (ref 1.1–3.7)
ABSOLUTE MONO #: 0.72 K/UL (ref 0.1–1.2)
ANION GAP SERPL CALCULATED.3IONS-SCNC: 8 MMOL/L (ref 9–17)
BASOPHILS # BLD: 1 % (ref 0–2)
BASOPHILS ABSOLUTE: 0.05 K/UL (ref 0–0.2)
BUN BLDV-MCNC: 12 MG/DL (ref 6–20)
BUN/CREAT BLD: ABNORMAL (ref 9–20)
CALCIUM SERPL-MCNC: 8.4 MG/DL (ref 8.6–10.4)
CHLORIDE BLD-SCNC: 107 MMOL/L (ref 98–107)
CO2: 26 MMOL/L (ref 20–31)
CREAT SERPL-MCNC: 0.58 MG/DL (ref 0.7–1.2)
DIFFERENTIAL TYPE: ABNORMAL
EOSINOPHILS RELATIVE PERCENT: 3 % (ref 1–4)
GFR AFRICAN AMERICAN: >60 ML/MIN
GFR NON-AFRICAN AMERICAN: >60 ML/MIN
GFR SERPL CREATININE-BSD FRML MDRD: ABNORMAL ML/MIN/{1.73_M2}
GFR SERPL CREATININE-BSD FRML MDRD: ABNORMAL ML/MIN/{1.73_M2}
GLUCOSE BLD-MCNC: 98 MG/DL (ref 70–99)
HCT VFR BLD CALC: 38.7 % (ref 40.7–50.3)
HEMOGLOBIN: 12.7 G/DL (ref 13–17)
IMMATURE GRANULOCYTES: 0 %
LYMPHOCYTES # BLD: 35 % (ref 24–43)
MCH RBC QN AUTO: 32 PG (ref 25.2–33.5)
MCHC RBC AUTO-ENTMCNC: 32.8 G/DL (ref 28.4–34.8)
MCV RBC AUTO: 97.5 FL (ref 82.6–102.9)
MONOCYTES # BLD: 8 % (ref 3–12)
NRBC AUTOMATED: 0 PER 100 WBC
PDW BLD-RTO: 12.6 % (ref 11.8–14.4)
PLATELET # BLD: 188 K/UL (ref 138–453)
PLATELET ESTIMATE: ABNORMAL
PMV BLD AUTO: 9.2 FL (ref 8.1–13.5)
POTASSIUM SERPL-SCNC: 3.9 MMOL/L (ref 3.7–5.3)
RBC # BLD: 3.97 M/UL (ref 4.21–5.77)
RBC # BLD: ABNORMAL 10*6/UL
SEG NEUTROPHILS: 53 % (ref 36–65)
SEGMENTED NEUTROPHILS ABSOLUTE COUNT: 4.65 K/UL (ref 1.5–8.1)
SODIUM BLD-SCNC: 141 MMOL/L (ref 135–144)
WBC # BLD: 8.8 K/UL (ref 3.5–11.3)
WBC # BLD: ABNORMAL 10*3/UL

## 2021-01-04 PROCEDURE — 6360000002 HC RX W HCPCS: Performed by: STUDENT IN AN ORGANIZED HEALTH CARE EDUCATION/TRAINING PROGRAM

## 2021-01-04 PROCEDURE — 85025 COMPLETE CBC W/AUTO DIFF WBC: CPT

## 2021-01-04 PROCEDURE — 6370000000 HC RX 637 (ALT 250 FOR IP): Performed by: STUDENT IN AN ORGANIZED HEALTH CARE EDUCATION/TRAINING PROGRAM

## 2021-01-04 PROCEDURE — 2000000000 HC ICU R&B

## 2021-01-04 PROCEDURE — 6370000000 HC RX 637 (ALT 250 FOR IP): Performed by: GENERAL PRACTICE

## 2021-01-04 PROCEDURE — 80048 BASIC METABOLIC PNL TOTAL CA: CPT

## 2021-01-04 PROCEDURE — 94761 N-INVAS EAR/PLS OXIMETRY MLT: CPT

## 2021-01-04 PROCEDURE — APPSS15 APP SPLIT SHARED TIME 0-15 MINUTES: Performed by: NURSE PRACTITIONER

## 2021-01-04 PROCEDURE — 2500000003 HC RX 250 WO HCPCS: Performed by: STUDENT IN AN ORGANIZED HEALTH CARE EDUCATION/TRAINING PROGRAM

## 2021-01-04 PROCEDURE — 2580000003 HC RX 258: Performed by: PHYSICIAN ASSISTANT

## 2021-01-04 RX ORDER — HYDROCODONE BITARTRATE AND ACETAMINOPHEN 5; 325 MG/1; MG/1
1 TABLET ORAL EVERY 6 HOURS PRN
Status: DISCONTINUED | OUTPATIENT
Start: 2021-01-04 | End: 2021-01-06

## 2021-01-04 RX ORDER — GABAPENTIN 300 MG/1
900 CAPSULE ORAL EVERY 8 HOURS
Status: DISCONTINUED | OUTPATIENT
Start: 2021-01-04 | End: 2021-01-04

## 2021-01-04 RX ORDER — GABAPENTIN 400 MG/1
400 CAPSULE ORAL EVERY 8 HOURS
Status: DISCONTINUED | OUTPATIENT
Start: 2021-01-04 | End: 2021-01-06

## 2021-01-04 RX ORDER — METHOCARBAMOL 750 MG/1
750 TABLET, FILM COATED ORAL 3 TIMES DAILY
Status: DISCONTINUED | OUTPATIENT
Start: 2021-01-04 | End: 2021-01-04

## 2021-01-04 RX ADMIN — Medication 2 MCG/MIN: at 05:26

## 2021-01-04 RX ADMIN — DOCUSATE SODIUM 100 MG: 100 CAPSULE, LIQUID FILLED ORAL at 08:38

## 2021-01-04 RX ADMIN — ACETAMINOPHEN 1000 MG: 650 SOLUTION ORAL at 01:07

## 2021-01-04 RX ADMIN — GABAPENTIN 600 MG: 300 CAPSULE ORAL at 01:08

## 2021-01-04 RX ADMIN — MIDODRINE HYDROCHLORIDE 10 MG: 5 TABLET ORAL at 14:59

## 2021-01-04 RX ADMIN — DIAZEPAM 5 MG: 5 TABLET ORAL at 03:34

## 2021-01-04 RX ADMIN — ENOXAPARIN SODIUM 30 MG: 30 INJECTION SUBCUTANEOUS at 20:01

## 2021-01-04 RX ADMIN — OXYCODONE HYDROCHLORIDE 5 MG: 5 SOLUTION ORAL at 08:48

## 2021-01-04 RX ADMIN — SODIUM CHLORIDE, PRESERVATIVE FREE 10 ML: 5 INJECTION INTRAVENOUS at 20:03

## 2021-01-04 RX ADMIN — IBUPROFEN 400 MG: 400 TABLET, FILM COATED ORAL at 16:43

## 2021-01-04 RX ADMIN — GABAPENTIN 600 MG: 300 CAPSULE ORAL at 08:38

## 2021-01-04 RX ADMIN — IBUPROFEN 400 MG: 400 TABLET, FILM COATED ORAL at 08:37

## 2021-01-04 RX ADMIN — HYDROCODONE BITARTRATE AND ACETAMINOPHEN 1 TABLET: 5; 325 TABLET ORAL at 21:50

## 2021-01-04 RX ADMIN — ACETAMINOPHEN 1000 MG: 650 SOLUTION ORAL at 08:34

## 2021-01-04 RX ADMIN — SODIUM CHLORIDE, PRESERVATIVE FREE 10 ML: 5 INJECTION INTRAVENOUS at 10:38

## 2021-01-04 ASSESSMENT — PAIN SCALES - GENERAL
PAINLEVEL_OUTOF10: 5
PAINLEVEL_OUTOF10: 7

## 2021-01-04 ASSESSMENT — PAIN DESCRIPTION - ONSET: ONSET: GRADUAL

## 2021-01-04 ASSESSMENT — PAIN DESCRIPTION - LOCATION: LOCATION: BACK

## 2021-01-04 NOTE — PLAN OF CARE
Problem: Pain:  Goal: Pain level will decrease  Description: Pain level will decrease  Outcome: Ongoing     Problem: Pain:  Goal: Control of acute pain  Description: Control of acute pain  Outcome: Ongoing     Problem: Pain:  Goal: Control of chronic pain  Description: Control of chronic pain  Outcome: Ongoing     Problem: Skin Integrity:  Goal: Will show no infection signs and symptoms  Description: Will show no infection signs and symptoms  Outcome: Ongoing     Problem: Skin Integrity:  Goal: Absence of new skin breakdown  Description: Absence of new skin breakdown  Outcome: Ongoing     Problem: Falls - Risk of:  Goal: Will remain free from falls  Description: Will remain free from falls  Outcome: Ongoing     Problem: Falls - Risk of:  Goal: Absence of physical injury  Description: Absence of physical injury  Outcome: Ongoing     Problem:  Activity:  Goal: Ability to tolerate increased activity will improve  Description: Ability to tolerate increased activity will improve  Outcome: Ongoing

## 2021-01-04 NOTE — FLOWSHEET NOTE
01/03/21 2030   Psychosocial   Psychosocial (WDL) WDL   Patient Behaviors Uncooperative;Aggressive verbally     Discussed safety concerns with patient. Discussed resting overnight and standing to do \"exercises\" or walking. Patient has been very active all day and has displayed signs of weakness, by shaking and needing more support from those helping him. After being educated patient stated \"I can tell you right now we are not going to get along. I am going to stand. I am telling you that right now. \" Patient was re-educated, and my safety concerns were re-verbalized. Will continue to monitor and re-educate as needed.

## 2021-01-04 NOTE — PROGRESS NOTES
Patient requested change in medication. Patient was already educated by the Day shift RN and resident. Patient stated \"Well I am just going to refuse my oxy because that's the medication I want. It doesn't get rid of my pain enough so I can walk. \" Patient was re-educated on why he is given the medication he is prescribed. Patient was re-educated on his limitations and his risk for falls. The patient was also reminded what possible outcomes may occur if he does fall. Will continue to monitor and inform resident on call.

## 2021-01-04 NOTE — PROGRESS NOTES
ICU PROGRESS NOTE      PATIENT NAME: Kevin Newell  MEDICAL RECORD NO. [id number]  DATE: 2021    PRIMARY CARE PHYSICIAN: Fallon Jenkins DO    HD: # 3    ASSESSMENT    Patient Active Problem List   Diagnosis    Fall from, out of or through balcony, initial encounter    [facility]    [facility])       MEDICAL DECISION MAKING AND PLAN    1. Neuro/Pain  -MMPT: Tylenol 1g q12, valium dc/d, neurontin 900mg q8, fentanyl dc'd, robaxin 750mg tid, Poonam making patient dizzy  -s/p c6/7 discectomy and fusion with CSF leak repair. -right upper and lower extremity weakness 2/5, consistent with before surgery   -PT/OT    2. CV  -HR 50-80, MAP goal>85 until   -Levo 2  -Midodrine 10mg q8    3. Heme  -Hgb 12.7 (13.0)  -Plts 188    4. Pulm  -99% RA  -Incentive spirometry 10x per hour while awake  -Acapella    5. Renal   -Sodium 141, potassium 3.9, chloride 107, bicarb 26  -Ca 8.4  -BUN 12/0.58    6. GI/FEN  -reg diet  -ensure shakes  -colace qd  -Ciwa: 0, valium removed    7. ID  -wbc 8.8 (13.1)  -afebrile    8. Endo  -Glucose 98    9. Lines  -PIV, r fem cvc, r fem a line    10. Proph  -lovenox 30mg bid  -pepcid, home med    11. Dispo  -ICU for MAP goal      CHECKLIST    RASS: -1+1  RESTRAINTS: None  IVF: PO  NUTRITION: regular diet  ANTIBIOTICS: none  GI: pepcid home med  DVT: lovenox  GLYCEMIC CONTROL: bg 108  HOB >45: not required    SUBJECTIVE    Kevin Newell  Is a 40 yo male who fell 30' over a balcony while intoxicated. C6/7 fx with r upper and lower extremity weakness, bilateral pulmonary contusions. Enjoying PT. Pain improving. Desire to ambulate. Reports deep burning sensation to the R forearm. Frequent exercise band use.  Complains of the way poonam makes him feel, asking for norco.     OBJECTIVE  VITALS: Temp: Temp: 98 °F (36.7 °C)Temp  Av.5 °F (36.9 °C)  Min: 97.7 °F (36.5 °C)  Max: 99.2 °F (87.6 °C) BP Systolic (67HSW), MLL:598 , Min:103 , DWL:961   Diastolic (32LBA), SEAN:42, Min:54, Max:89   Pulse Pulse  Av.9  Min: 43  Max: 78 Resp Resp  Av.3  Min: 11  Max: 26 Pulse ox SpO2  Av %  Min: 89 %  Max: 100 %    GENERAL: alert, no distress  NEURO: positive findings: muscular weakness of the right upper and lower extremity, no decreased sensation, reflexes intact bilaterally extremities. Reported paresthesias L upper extremity improved  HEAD: normocephalic, atraumatic   EYES: sclera clear, pupils equal and reactive to light  ENT: moist mucous membranes   : Pt voiding without need for straight cath.   LUNGS: clear to auscultation bilaterally- no wheezes, rales or rhonchi  HEART: normal rate and regular rhythm  ABDOMEN: soft, non-tender, non-distended, bowel sounds present in all 4 quadrants and no guarding or peritoneal signs present  EXTERMITY: peripheral pulses present no cyanosis, clubbing or edema  SKIN: normal coloration and turgor       Drain/tube output:  Net -3252  In 421  Out 1450    LAB:  CBC:   Recent Labs     21  0436 21  0409 21  0522   WBC 16.9* 13.1* 8.8   HGB 13.6 13.0 12.7*   HCT 40.9 39.6* 38.7*   MCV 96.5 96.4 97.5    248 188     BMP:   Recent Labs     21  0436 21  0409 21  0522    139 141   K 4.1 3.9 3.9    105 107   CO2 20 22 26   BUN 10 13 12   CREATININE 0.61* 0.70 0.58*   GLUCOSE 125* 108* 98         RADIOLOGY:  No new imaging      Pablo Ramsey DO  21, 9:26 AM

## 2021-01-04 NOTE — PROGRESS NOTES
Neurosurgery RONEL/Resident    Daily Progress Note   CC:No chief complaint on file. 1/4/2021  6:06 AM    Chart reviewed. No acute events overnight. Reports pain for the most part is controlled at this time though he has increase while up walking. Afebrile. In cervical collar. Reports that the Oxycodone makes him feel too drowsy and does not like to take that medication. On Proamatine and a touch of levophed for MAP >85.  No difficulty swallowing, tolerating diet well, working with PT     Vitals:    01/04/21 0515 01/04/21 0530 01/04/21 0545 01/04/21 0600   BP:       Pulse: (!) 46 (!) 45 (!) 43 (!) 46   Resp: 18 20 19 16   Temp:       TempSrc:       SpO2: 99% 100% 100% 100%   Weight:       Height:           PE:   AOx3   No dysphagia  Decreased dexterity right hand   Motor   L deltoid 5/5; R deltoid 5/5  L biceps 5/5; R biceps 5/5  L triceps 5/5; R triceps 4+/5  L wrist extension 5/5; R wrist extension 5/5  Left hand grasp remains stronger than right at this time      L iliopsoas 5/5 , R iliopsoas 5/5  L quadriceps 5/5; R quadriceps 4+/5  L Dorsiflexion 5/5; R dorsiflexion 4+/5  L Plantarflexion 5/5; R plantarflexion 5/5  L EHL 5/5; R EHL 3+/5      Sensation: allodynia to bilateral hands to forearms     Incision: intact, closed with steri strips and covered with dressing      Lab Results   Component Value Date    WBC 8.8 01/04/2021    HGB 12.7 (L) 01/04/2021    HCT 38.7 (L) 01/04/2021     01/04/2021    ALT 21 01/02/2021    AST 22 01/02/2021     01/04/2021    K 3.9 01/04/2021     01/04/2021    CREATININE 0.58 (L) 01/04/2021    BUN 12 01/04/2021    CO2 26 01/04/2021    INR 1.0 01/02/2021       A/P  39 y.o. male who presents with   cervical cord compression secondary to traumatic disk rupture and fracture of C6-C7  POD #3 s/p anterior cervical discectomy and fusion C6-C7    - PT and OT for mobilization  - Continue MAP protocol currently on low dose Levophed and started on Midodrine   - MAP protocol until 1/8   - keep cervical collar on at all times other than eating and hygiene but place back on after  - Continue current pain regimen  - encourage use of incentive spirometer   - on lovenox for DVT ppx       Please contact neurosurgery with any changes in patients neurologic status.        Kavitha Morse CNP  1/4/21  6:06 AM

## 2021-01-04 NOTE — PROGRESS NOTES
Physical Therapy  DATE: 2021    NAME: Chencho Tejada  MRN: [id number]   : 1975    Patient not seen this date for Physical Therapy due to:  [] Blood transfusion in progress  [] Hemodialysis  [x] Patient Declined stating he feels loopy form the new medication he took and would like writer to check back if time permits. Checked on Pt for a 2nd time this PM and he state he is still not ready for therapy and would like to reschedule for 20  [] Spine Precautions   [] Strict Bedrest  [] Surgery/ Procedure  [] Testing      [] Other        [] PT is being discontinued at this time. Patient independent. No further needs. [] PT is being discontinued at this time due to declining physical/ medical status. Therapy is not appropriate at this time.     Harinder Chairez, PTA

## 2021-01-04 NOTE — FLOWSHEET NOTE
01/03/21 2116   Provider Notification   Reason for Communication Evaluate; Review case   Provider Name    Provider Notification Resident   Method of Communication Secure Message   Response Waiting for response   Notification Time 2116       Informed resident of pts impulsive behavior and attempts to stand. Resident also informed of gf and her helping to get patient up. Resident requested to keep educating pt and if gf persists to no longer allow her to stay at bedside. Will continue to monitor and educate.

## 2021-01-05 LAB
ABSOLUTE EOS #: 0.27 K/UL (ref 0–0.44)
ABSOLUTE IMMATURE GRANULOCYTE: <0.03 K/UL (ref 0–0.3)
ABSOLUTE LYMPH #: 3.5 K/UL (ref 1.1–3.7)
ABSOLUTE MONO #: 0.72 K/UL (ref 0.1–1.2)
ANION GAP SERPL CALCULATED.3IONS-SCNC: 11 MMOL/L (ref 9–17)
BASOPHILS # BLD: 0 % (ref 0–2)
BASOPHILS ABSOLUTE: 0.03 K/UL (ref 0–0.2)
BUN BLDV-MCNC: 12 MG/DL (ref 6–20)
BUN/CREAT BLD: ABNORMAL (ref 9–20)
CALCIUM SERPL-MCNC: 8.5 MG/DL (ref 8.6–10.4)
CHLORIDE BLD-SCNC: 105 MMOL/L (ref 98–107)
CO2: 24 MMOL/L (ref 20–31)
CREAT SERPL-MCNC: 0.61 MG/DL (ref 0.7–1.2)
DIFFERENTIAL TYPE: ABNORMAL
EOSINOPHILS RELATIVE PERCENT: 3 % (ref 1–4)
GFR AFRICAN AMERICAN: >60 ML/MIN
GFR NON-AFRICAN AMERICAN: >60 ML/MIN
GFR SERPL CREATININE-BSD FRML MDRD: ABNORMAL ML/MIN/{1.73_M2}
GFR SERPL CREATININE-BSD FRML MDRD: ABNORMAL ML/MIN/{1.73_M2}
GLUCOSE BLD-MCNC: 90 MG/DL (ref 70–99)
HCT VFR BLD CALC: 38.2 % (ref 40.7–50.3)
HEMOGLOBIN: 12.3 G/DL (ref 13–17)
IMMATURE GRANULOCYTES: 0 %
LYMPHOCYTES # BLD: 40 % (ref 24–43)
MCH RBC QN AUTO: 31.3 PG (ref 25.2–33.5)
MCHC RBC AUTO-ENTMCNC: 32.2 G/DL (ref 28.4–34.8)
MCV RBC AUTO: 97.2 FL (ref 82.6–102.9)
MONOCYTES # BLD: 8 % (ref 3–12)
NRBC AUTOMATED: 0 PER 100 WBC
PDW BLD-RTO: 12.4 % (ref 11.8–14.4)
PLATELET # BLD: 211 K/UL (ref 138–453)
PLATELET ESTIMATE: ABNORMAL
PMV BLD AUTO: 9.7 FL (ref 8.1–13.5)
POTASSIUM SERPL-SCNC: 4.1 MMOL/L (ref 3.7–5.3)
RBC # BLD: 3.93 M/UL (ref 4.21–5.77)
RBC # BLD: ABNORMAL 10*6/UL
SEG NEUTROPHILS: 49 % (ref 36–65)
SEGMENTED NEUTROPHILS ABSOLUTE COUNT: 4.15 K/UL (ref 1.5–8.1)
SODIUM BLD-SCNC: 140 MMOL/L (ref 135–144)
WBC # BLD: 8.7 K/UL (ref 3.5–11.3)
WBC # BLD: ABNORMAL 10*3/UL

## 2021-01-05 PROCEDURE — 6360000002 HC RX W HCPCS: Performed by: STUDENT IN AN ORGANIZED HEALTH CARE EDUCATION/TRAINING PROGRAM

## 2021-01-05 PROCEDURE — 6370000000 HC RX 637 (ALT 250 FOR IP): Performed by: GENERAL PRACTICE

## 2021-01-05 PROCEDURE — 6370000000 HC RX 637 (ALT 250 FOR IP): Performed by: NURSE PRACTITIONER

## 2021-01-05 PROCEDURE — APPSS15 APP SPLIT SHARED TIME 0-15 MINUTES: Performed by: NURSE PRACTITIONER

## 2021-01-05 PROCEDURE — 99253 IP/OBS CNSLTJ NEW/EST LOW 45: CPT | Performed by: STUDENT IN AN ORGANIZED HEALTH CARE EDUCATION/TRAINING PROGRAM

## 2021-01-05 PROCEDURE — 2000000000 HC ICU R&B

## 2021-01-05 PROCEDURE — 6370000000 HC RX 637 (ALT 250 FOR IP): Performed by: STUDENT IN AN ORGANIZED HEALTH CARE EDUCATION/TRAINING PROGRAM

## 2021-01-05 PROCEDURE — 2060000000 HC ICU INTERMEDIATE R&B

## 2021-01-05 PROCEDURE — 80048 BASIC METABOLIC PNL TOTAL CA: CPT

## 2021-01-05 PROCEDURE — 97530 THERAPEUTIC ACTIVITIES: CPT

## 2021-01-05 PROCEDURE — 85025 COMPLETE CBC W/AUTO DIFF WBC: CPT

## 2021-01-05 PROCEDURE — 2580000003 HC RX 258: Performed by: PHYSICIAN ASSISTANT

## 2021-01-05 PROCEDURE — 97116 GAIT TRAINING THERAPY: CPT

## 2021-01-05 RX ORDER — POLYETHYLENE GLYCOL 3350 17 G/17G
17 POWDER, FOR SOLUTION ORAL DAILY
Status: DISCONTINUED | OUTPATIENT
Start: 2021-01-05 | End: 2021-01-09 | Stop reason: HOSPADM

## 2021-01-05 RX ORDER — LANOLIN ALCOHOL/MO/W.PET/CERES
3 CREAM (GRAM) TOPICAL ONCE
Status: DISCONTINUED | OUTPATIENT
Start: 2021-01-05 | End: 2021-01-09

## 2021-01-05 RX ADMIN — DOCUSATE SODIUM 100 MG: 100 CAPSULE, LIQUID FILLED ORAL at 07:51

## 2021-01-05 RX ADMIN — SODIUM CHLORIDE, PRESERVATIVE FREE 10 ML: 5 INJECTION INTRAVENOUS at 20:48

## 2021-01-05 RX ADMIN — SODIUM CHLORIDE, PRESERVATIVE FREE 10 ML: 5 INJECTION INTRAVENOUS at 07:49

## 2021-01-05 RX ADMIN — MIDODRINE HYDROCHLORIDE 10 MG: 5 TABLET ORAL at 00:21

## 2021-01-05 RX ADMIN — IBUPROFEN 400 MG: 400 TABLET, FILM COATED ORAL at 12:00

## 2021-01-05 RX ADMIN — ENOXAPARIN SODIUM 30 MG: 30 INJECTION SUBCUTANEOUS at 22:00

## 2021-01-05 RX ADMIN — ENOXAPARIN SODIUM 30 MG: 30 INJECTION SUBCUTANEOUS at 07:50

## 2021-01-05 RX ADMIN — MIDODRINE HYDROCHLORIDE 10 MG: 5 TABLET ORAL at 07:51

## 2021-01-05 RX ADMIN — IBUPROFEN 400 MG: 400 TABLET, FILM COATED ORAL at 07:51

## 2021-01-05 RX ADMIN — POLYETHYLENE GLYCOL 3350 17 G: 17 POWDER, FOR SOLUTION ORAL at 07:51

## 2021-01-05 RX ADMIN — MIDODRINE HYDROCHLORIDE 10 MG: 5 TABLET ORAL at 17:24

## 2021-01-05 ASSESSMENT — PAIN SCALES - GENERAL
PAINLEVEL_OUTOF10: 0

## 2021-01-05 ASSESSMENT — PAIN DESCRIPTION - PROGRESSION
CLINICAL_PROGRESSION: GRADUALLY IMPROVING

## 2021-01-05 ASSESSMENT — PAIN - FUNCTIONAL ASSESSMENT: PAIN_FUNCTIONAL_ASSESSMENT: PREVENTS OR INTERFERES SOME ACTIVE ACTIVITIES AND ADLS

## 2021-01-05 ASSESSMENT — ENCOUNTER SYMPTOMS
SHORTNESS OF BREATH: 0
CONSTIPATION: 1

## 2021-01-05 ASSESSMENT — PAIN DESCRIPTION - ORIENTATION: ORIENTATION: POSTERIOR

## 2021-01-05 ASSESSMENT — PAIN DESCRIPTION - DESCRIPTORS: DESCRIPTORS: ACHING;CRAMPING

## 2021-01-05 ASSESSMENT — PAIN DESCRIPTION - ONSET: ONSET: GRADUAL

## 2021-01-05 NOTE — PLAN OF CARE
Problem: Pain:  Goal: Pain level will decrease  Description: Pain level will decrease  1/5/2021 0833 by Kati Ram RN  Outcome: Ongoing  1/4/2021 2317 by Jenna Wu RN  Outcome: Ongoing  1/4/2021 2310 by Jenna Wu RN  Outcome: Ongoing  Goal: Control of acute pain  Description: Control of acute pain  1/5/2021 0833 by Kati Ram RN  Outcome: Ongoing  1/4/2021 2317 by Jenna Wu RN  Outcome: Ongoing  1/4/2021 2310 by Jenna Wu RN  Outcome: Ongoing  Goal: Control of chronic pain  Description: Control of chronic pain  1/5/2021 0833 by Kati Ram RN  Outcome: Ongoing  1/4/2021 2317 by Jenna Wu RN  Outcome: Ongoing  1/4/2021 2310 by Jenna Wu RN  Outcome: Ongoing     Problem: Skin Integrity:  Goal: Will show no infection signs and symptoms  Description: Will show no infection signs and symptoms  1/5/2021 0833 by Kati Ram RN  Outcome: Ongoing  1/4/2021 2317 by Jenna Wu RN  Outcome: Ongoing  1/4/2021 2310 by Jenna Wu RN  Outcome: Ongoing  Goal: Absence of new skin breakdown  Description: Absence of new skin breakdown  1/5/2021 0833 by Kati Ram RN  Outcome: Ongoing  1/4/2021 2317 by Jenna Wu RN  Outcome: Ongoing  1/4/2021 2310 by Jenna Wu RN  Outcome: Ongoing     Problem: Falls - Risk of:  Goal: Will remain free from falls  Description: Will remain free from falls  1/5/2021 0833 by Kati Ram RN  Outcome: Ongoing  1/4/2021 2317 by Jenna Wu RN  Outcome: Ongoing  Goal: Absence of physical injury  Description: Absence of physical injury  1/5/2021 0833 by Kati Ram RN  Outcome: Ongoing  1/4/2021 2317 by Jenna Wu RN  Outcome: Ongoing     Problem:  Activity:  Goal: Ability to tolerate increased activity will improve  Description: Ability to tolerate increased activity will improve  Outcome: Ongoing

## 2021-01-05 NOTE — FLOWSHEET NOTE
1000 Pt states he feels \"loopy\" after Roxicodone. States\" I refuse to take that medication again\" . Trauma service notified. Pt refuses to work with PT at this time   1400: Jesse Castaneda from PT checked on pt again.  Pt states he still does not feel well after am medications and refuses PT. radiology results/lab results lab results/need for outpatient follow-up/return to ED if symptoms worsen, persist or questions arise/radiology results

## 2021-01-05 NOTE — PLAN OF CARE
Problem: Pain:  Goal: Pain level will decrease  Description: Pain level will decrease  1/4/2021 2310 by Marquis Chang RN  Outcome: Ongoing  1/4/2021 1400 by Jen Catherine RN  Outcome: Ongoing  Goal: Control of acute pain  Description: Control of acute pain  1/4/2021 2310 by Marquis Chang RN  Outcome: Ongoing  1/4/2021 1400 by Jen Catherine RN  Outcome: Ongoing  Goal: Control of chronic pain  Description: Control of chronic pain  1/4/2021 2310 by Marquis Chang RN  Outcome: Ongoing  1/4/2021 1400 by Jen Catherine RN  Outcome: Ongoing     Problem: Skin Integrity:  Goal: Will show no infection signs and symptoms  Description: Will show no infection signs and symptoms  1/4/2021 2310 by Marquis Chang RN  Outcome: Ongoing  1/4/2021 1400 by Jen Catherine RN  Outcome: Ongoing  Goal: Absence of new skin breakdown  Description: Absence of new skin breakdown  1/4/2021 2310 by Marquis Chang RN  Outcome: Ongoing  1/4/2021 1400 by Jen Catherine RN  Outcome: Ongoing     Problem: Falls - Risk of:  Goal: Will remain free from falls  Description: Will remain free from falls  1/4/2021 1400 by Jen Catherine RN  Outcome: Ongoing  Goal: Absence of physical injury  Description: Absence of physical injury  1/4/2021 1400 by Jen Catherine RN  Outcome: Ongoing     Problem:  Activity:  Goal: Ability to tolerate increased activity will improve  Description: Ability to tolerate increased activity will improve  1/4/2021 1400 by Jen Catherine RN  Outcome: Ongoing

## 2021-01-05 NOTE — CONSULTS
Physical Medicine & Rehabilitation  Consult Note      Admitting Physician:  Lashawn Gomes MD    Primary Care Provider:  Marie Wright DO     Reason for Consult:  Acute Inpatient Rehabilitation    Chief Complaint:  Fall from balcony    History of Present Illness:  Referring Provider is requesting an evaluation for appropriate placement upon discharge from acute care. History from chart review and patient. Lavaun Severs is a 39 y.o. right-handed male admitted to [facility] on 1/1/2021. He initially presented to [facility] after a fall from a balcony, approximately 30 feet. Unknown LOC. CT neck showed C6 and C7 fractures and he was transferred to [facility] for further management. GCS 15 on evaluation. CT head showed no acute intracranial abnormality. CT cervical spine showed C7 vertebral body fracture, C6 and C7 superior right facet fractures, and C6 on C7 traumatic anterolisthesis. MRI cervical spine also showed small ventral epidural hematoma (without cord compression) and mild cord contusion at C6-C7. He was also found to have bilateral pulmonary contusions. He underwent C6-C7 ACDF with CSF leak repair on 1/1/21 (Dr. Karyna Solis). He has cervical collar in place at all times other than for eating and hygiene, per neurosurgery. He reports numbness/tingling throughout the body below the neck as well as weakness in the limbs, worse in the right upper and lower limbs than the left. He reports some neck pain. He notes that he has not yet had a bowel movement and states that he has had one episode of urinary incontinence. He denies any other acute concerns. Review of Systems:  Review of Systems   Respiratory: Negative for shortness of breath. Cardiovascular: Negative for chest pain. Gastrointestinal: Positive for constipation. Genitourinary:        +one episode of urinary incontinence   Musculoskeletal: Positive for neck pain.    Neurological: Positive for tingling, sensory and pt demo poor safety judgment and  mild denial of deficits. OT:   ADL  Feeding: Minimal assistance, Setup, Verbal cueing, Increased time to complete, Adaptive utensils (comment)(pt req assist to open containers; provided with built up utensils)  Grooming: Stand by assistance, Setup, Increased time to complete(oral care and face washing completed seated at sink pt reqe increased time for utensil management)  UE Bathing: Minimal assistance, Setup, Verbal cueing, Increased time to complete  LE Bathing: Moderate assistance, Setup, Verbal cueing, Increased time to complete  UE Dressing: Minimal assistance, Setup, Verbal cueing, Increased time to complete  LE Dressing: Moderate assistance, Setup, Verbal cueing, Increased time to complete  Toileting: Moderate assistance, Setup, Increased time to complete  Additional Comments: Pt extremely motivated throughout session however impuslive with decreased safety awareness. Pt req increased time for ADLs sec to increased fatigue and decreased strength. Throughout session pt c/o weakness in RUE with approx 4-5 LOG noted. [address] and grasping activites provided to pt, education provided on techniques and importance of cont activity         Balance  Sitting Balance: Stand by assistance(Pt seated by self on EOB at start of session however 1 anterior LOB noted with dynamic sitting)  Standing Balance:  Moderate assistance   Standing Balance  Time: Pt tolerated approx 2-3 min  Activity: static standing  Comment: utilizing RW pt unsteady req increased assist for balance maintaince  Functional Mobility  Functional - Mobility Device: Rolling Walker  Activity: To/from bathroom  Assist Level: Minimal assistance  Functional Mobility Comments: EOB>sink>chair req 1 seated rest break sec to fatigue with increased difficulty noted to lift RLE     Bed mobility  Supine to Sit: Contact guard assistance  Sit to Supine: (pt retired to toilet seat.)  Scooting: Contact guard assistance  Comment: in 2 mcg/min, Intravenous, Continuous    Family History:   No family history on file.     Social History:  Lives With: Alone  Type of Home: Apartment  Home Layout: One level  Home Access: Stairs to enter with rails  Entrance Stairs - Number of Steps: ~30  Entrance Stairs - Rails: Both  Bathroom Shower/Tub: Tub/Shower unit  Bathroom Toilet: Standard  Home Equipment: (no DME use at baseline)  ADL Assistance: [address]: Independent  Homemaking Responsibilities: Yes  Ambulation Assistance: Independent  Transfer Assistance: Independent  Active : Yes  Mode of Transportation: Truck  Occupation: Part time employment  Type of occupation: ; working M-W  Leisure & Hobbies: play [employer]  Social History     Socioeconomic History    Marital status: Single     Spouse name: Not on file    Number of children: Not on file    Years of education: Not on file    Highest education level: Not on file   Occupational History    Not on file   Social Needs    Financial resource strain: Not on file    Food insecurity     Worry: Not on file     Inability: Not on file   [employer] needs     Medical: Not on file     Non-medical: Not on file   Tobacco Use    Smoking status: Not on file   Substance and Sexual Activity    Alcohol use: Not on file    Drug use: Not on file    Sexual activity: Not on file   Lifestyle    Physical activity     Days per week: Not on file     Minutes per session: Not on file    Stress: Not on file   Relationships    Social connections     Talks on phone: Not on file     Gets together: Not on file     Attends Jehovah's witness service: Not on file     Active member of club or organization: Not on file     Attends meetings of clubs or organizations: Not on file     Relationship status: Not on file    Intimate partner violence     Fear of current or ex partner: Not on file     Emotionally abused: Not on file     Physically abused: Not on file     Forced sexual 4.1    107 105   CO2 22 26 24   BUN 13 12 12   CREATININE 0.70 0.58* 0.61*   GLUCOSE 108* 98 90      HbA1c: No results found for: LABA1C  BNP: No results for input(s): BNP in the last 72 hours. PT/INR: No results for input(s): PROTIME, INR in the last 72 hours. APTT: No results for input(s): APTT in the last 72 hours. CARDIAC ENZYMES: No results for input(s): CKMB, CKMBINDEX, TROPONINT in the last 72 hours. Invalid input(s): CKTOTAL;3  FASTING LIPID PANEL:No results found for: CHOL, HDL, TRIG  LIVER PROFILE: No results for input(s): AST, ALT, ALB, BILIDIR, BILITOT, ALKPHOS in the last 72 hours. Radiology:  XR SHOULDER LEFT (MIN 2 VIEWS)   Final Result   No acute osseous abnormality. XR HUMERUS LEFT (MIN 2 VIEWS)   Final Result   No acute osseous abnormality. XR RADIUS ULNA LEFT (2 VIEWS)   Final Result   No acute osseous abnormality. XR HAND LEFT (MIN 3 VIEWS)   Final Result   No acute osseous abnormality. XR CERVICAL SPINE 1 VW   Final Result      XR CERVICAL SPINE 1 VW   Final Result      XR CERVICAL SPINE 1 VW   Final Result      MRI CERVICAL SPINE WO CONTRAST   Final Result   Acute anterior column fracture of C7 with loss of 30% of vertebral body   height. Acute right lateral articular pillar fractures at C6 on C7. Mild anterolisthesis at C6-C7 measuring 3 mm. Posterior soft tissue edema   from C3 through C7 is compatible with ligamentous injury. Small ventral epidural hematoma C6 resulting in mild central canal stenosis   without cord compression. Hyperintense T2 signal abnormality within the cervical cord at C6 on C7   compatible with mild cord contusion. Moderate left foraminal stenosis at   C5-C6 due to uncovertebral joint hypertrophy and facet disease. CTA NECK W CONTRAST   Final Result   No CT angiographic evidence of acute trauma involving the major arterial   vessels of the neck.       Cervical spine acute fractures as described on CT cervical spine without   contrast examination from same date. CT CHEST ABDOMEN PELVIS W CONTRAST   Final Result   1. Note: Study significantly limited by patient motion related artifact. 2. Ill-defined multifocal consolidation involving the left upper lung lobe   and anterior right middle lobe suggestive of pulmonary parenchymal contusion   in setting of trauma. 3. No further definitive evidence of acute trauma involving the chest,   abdomen or pelvis. CT THORACIC SPINE TRAUMA RECONSTRUCTION   Final Result   1. No acute abnormality of the thoracic or lumbar spine. 2. T11/T12 increased intervertebral disc density suggesting presence of   calcification, most likely secondary to degenerative change. 3. Multilevel minimal spondylosis without associated central canal   stenosis/compromise. CT LUMBAR SPINE TRAUMA RECONSTRUCTION   Final Result   1. No acute abnormality of the thoracic or lumbar spine. 2. T11/T12 increased intervertebral disc density suggesting presence of   calcification, most likely secondary to degenerative change. 3. Multilevel minimal spondylosis without associated central canal   stenosis/compromise. CT HEAD WO CONTRAST   Final Result   1. No evidence of acute intracranial trauma. 2. C7 vertebral body superoanterior corner impacted acute fracture, as   discussed above. 3. Adjacent C6 and C7 superior right facet oblique mildly displaced acute   fractures. 4. Subsequent C6 on C7 traumatic anterolisthesis measuring 2.5 mm. CT CERVICAL SPINE WO CONTRAST   Final Result   1. No evidence of acute intracranial trauma. 2. C7 vertebral body superoanterior corner impacted acute fracture, as   discussed above. 3. Adjacent C6 and C7 superior right facet oblique mildly displaced acute   fractures. 4. Subsequent C6 on C7 traumatic anterolisthesis measuring 2.5 mm. Impression:    1.  Cervical spinal cord injury secondary to fall from

## 2021-01-05 NOTE — PROGRESS NOTES
to be sleepy and dizzy when working with PT.    OBJECTIVE  VITALS: Temp: Temp: 99.5 °F (37.5 °C)Temp  Av.8 °F (37.1 °C)  Min: 96.8 °F (36 °C)  Max: 99.5 °F (59.0 °C) BP Systolic (32GSQ), CPH:742 , Min:80 , NZC:193   Diastolic (19DQS), BGV:69, Min:48, Max:85   Pulse Pulse  Av.8  Min: 53  Max: 72 Resp Resp  Av.4  Min: 5  Max: 24 Pulse ox SpO2  Av.1 %  Min: 91 %  Max: 99 %    GENERAL: alert and oriented, no distress  NEURO: positive findings: muscular weakness of the right upper and lower extremity, no decreased sensation, reflexes intact bilaterally extremities. Reported paresthesias L upper extremity improved  HEAD: normocephalic, atraumatic   EYES: sclera clear, pupils equal and reactive   ENT: moist mucous membranes   LUNGS: normal effort on RA, no respiratory distress, no accessory muscle use   HEART: normal rate and regular rhythm  ABDOMEN: soft, non-tender, non-distended, no guarding or peritoneal signs   EXTREMITY:  no cyanosis, clubbing or edema.  +3/5 strength R wrist, +3/5 strength with plantar/dorsiflexion of R foot   +5/5 motor strength to left upper and left lower extremity   SKIN: normal coloration and turgor     LAB:  CBC:   Recent Labs     21  0409 21  0522 21  0514   WBC 13.1* 8.8 8.7   HGB 13.0 12.7* 12.3*   HCT 39.6* 38.7* 38.2*   MCV 96.4 97.5 97.2    188 211     BMP:   Recent Labs     21  0409 21  0522 21  0514    141 140   K 3.9 3.9 4.1    107 105   CO2 22 26 24   BUN 13 12 12   CREATININE 0.70 0.58* 0.61*   GLUCOSE 108* 98 90       RADIOLOGY:  No new imaging      Henri Gibbs DO  General Surgery PGY-2

## 2021-01-05 NOTE — PLAN OF CARE
Problem: Pain:  Goal: Pain level will decrease  Description: Pain level will decrease  1/4/2021 2317 by Aly Juarez RN  Outcome: Ongoing  1/4/2021 2310 by Aly Juarez RN  Outcome: Ongoing  1/4/2021 1400 by Carrillo Reynoso RN  Outcome: Ongoing  Goal: Control of acute pain  Description: Control of acute pain  1/4/2021 2317 by Aly Juarez RN  Outcome: Ongoing  1/4/2021 2310 by Aly Juarez RN  Outcome: Ongoing  1/4/2021 1400 by Carrillo Reynoso RN  Outcome: Ongoing  Goal: Control of chronic pain  Description: Control of chronic pain  1/4/2021 2317 by Aly Juarez RN  Outcome: Ongoing  1/4/2021 2310 by Aly Juarez RN  Outcome: Ongoing  1/4/2021 1400 by Carrillo Reynoso RN  Outcome: Ongoing     Problem: Skin Integrity:  Goal: Will show no infection signs and symptoms  Description: Will show no infection signs and symptoms  1/4/2021 2317 by Aly Juarez RN  Outcome: Ongoing  1/4/2021 2310 by Aly Juarez RN  Outcome: Ongoing  1/4/2021 1400 by Carrillo Reynoso RN  Outcome: Ongoing  Goal: Absence of new skin breakdown  Description: Absence of new skin breakdown  1/4/2021 2317 by Aly Juarez RN  Outcome: Ongoing  1/4/2021 2310 by Aly Juarez RN  Outcome: Ongoing  1/4/2021 1400 by Carrillo Reynoso RN  Outcome: Ongoing     Problem: Falls - Risk of:  Goal: Will remain free from falls  Description: Will remain free from falls  1/4/2021 2317 by Aly Juarez RN  Outcome: Ongoing  1/4/2021 1400 by Carrillo Reynoso RN  Outcome: Ongoing  Goal: Absence of physical injury  Description: Absence of physical injury  1/4/2021 2317 by Aly Juarez RN  Outcome: Ongoing  1/4/2021 1400 by Carrillo Reynoso RN  Outcome: Ongoing     Problem:  Activity:  Goal: Ability to tolerate increased activity will improve  Description: Ability to tolerate increased activity will improve  1/4/2021 1400 by Carrillo Reynoso RN  Outcome: Ongoing

## 2021-01-05 NOTE — PROGRESS NOTES
Occupational Therapy    Occupational Therapy Not Seen Note    DATE: 2021  Name: Wil Costa  : 1975  MRN: [id number]    Patient not available for Occupational Therapy due to:    Patient Declined : Attempted in AM, pt stated experiencing dizziness during toileting. Therapist agreed to return after lunch, pt still not feeling well, weak.         Electronically signed by MARY Al on 2021 at 1:48 PM

## 2021-01-05 NOTE — PROGRESS NOTES
Neurosurgery RONEL/Resident    Daily Progress Note   CC:No chief complaint on file. 1/5/2021  6:18 AM    Chart reviewed. No acute events overnight. No new complaints. Afebrile. Off levophed since 1800 last night. MAP about 80. Denies chest pain, SOB, nausea and vomiting.  Reports pain and comfort is much better after receiving Norco for pain management     Vitals:    01/05/21 0300 01/05/21 0400 01/05/21 0500 01/05/21 0600   BP: 132/76 (!) 120/56 111/63 110/67   Pulse: 55 55 54 53   Resp: 22 20 14 (!) 5   Temp:  99.5 °F (37.5 °C)     TempSrc:  Oral     SpO2: 96% 94% 95% 96%   Weight:       Height:           PE:   AOx3   MOTOR  L deltoid 5/5; R deltoid 5/5  L biceps 5/5; R biceps 5/5  L triceps 5/5; R triceps 5/5  L wrist extension 5/5; R wrist extension 4+/5  L intrinsics 5/5; R intrinsics 4/5   Right hand grasp weaker than left      L iliopsoas 5/5 , R iliopsoas 5/5  L quadriceps 5/5; R quadriceps 5/5  L Dorsiflexion 5/5; R dorsiflexion 4/5  L Plantarflexion 5/5; R plantarflexion 5/5  L EHL 5/5; R EHL 3+/5      Sensation: numbness per patient to bilateral hands to elbows and is using ice to help      Incision: intact covered with steri strips, open to air, cervical collar in place       Lab Results   Component Value Date    WBC 8.7 01/05/2021    HGB 12.3 (L) 01/05/2021    HCT 38.2 (L) 01/05/2021     01/05/2021    ALT 21 01/02/2021    AST 22 01/02/2021     01/05/2021    K 4.1 01/05/2021     01/05/2021    CREATININE 0.61 (L) 01/05/2021    BUN 12 01/05/2021    CO2 24 01/05/2021    INR 1.0 01/02/2021       A/P  39 y.o. male who presents with cervical cord compression secondary to traumatic disk rupture and fracture of C6-C7  POD #4 s/p anterior cervical discectomy and fusion C6-C7    - PT and OT for mobilization  - MAP around 80 ok from neurosurgery standpoint   - pain better controlled with Norco per patient  - Keep cervical collar on at all time so there than eating and hygiene purposes , place back on after   - on Lovenox for DVT ppx     Please contact neurosurgery with any changes in patients neurologic status.        Amira Garcia CNP  1/5/21  6:18 AM

## 2021-01-05 NOTE — PROGRESS NOTES
Em EDWARDS spoke with Ish Simmons from [facility]. States he received a phone call from pt. Em EDWARDS informed Ish Simmons of pt.'s admitting diagnosis and  admitting physician. All questions and concerns answered.
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
## 2021-01-05 NOTE — PROGRESS NOTES
Physical Therapy  Facility/Department: [address] SICU  Daily Treatment Note  NAME: Meri Mata  : 1975  MRN: [id number]    Date of Service: 2021    Discharge Recommendations:  Patient would benefit from continued therapy after discharge   PT Equipment Recommendations  Other: CTA    Assessment   Body structures, Functions, Activity limitations: Decreased functional mobility ; Decreased endurance;Decreased strength;Decreased safe awareness;Decreased balance  Assessment: Pt amb 200 ft with RW, Roya and wheelchair follow for safety. Pt unsafe to return to prior living arrangements as pt demo poor safety judgment and unaware of deficits. Pt would benefit from more PT to return to PLOF. Prognosis: Good  PT Education: Functional Mobility Training;Plan of Care;General Safety; Injury Prevention;Gait Training;Equipment;Transfer Training;Goals  REQUIRES PT FOLLOW UP: Yes  Activity Tolerance  Activity Tolerance: Patient limited by fatigue;Patient limited by endurance     Patient Diagnosis(es): The encounter diagnosis was Injury resulting from fall from height.     has no past medical history on file. has a past surgical history that includes Cervical disc surgery (2021). Restrictions  Restrictions/Precautions  Restrictions/Precautions: Fall Risk  Required Braces or Orthoses?: Yes  Required Braces or Orthoses  Cervical: c-collar  Position Activity Restriction  Other position/activity restrictions: Activity as tolerated; TLS clear; R groin ART line  Subjective   General  Response To Previous Treatment: Patient with no complaints from previous session.   Family / Caregiver Present: No  Subjective  Subjective: RN and pt agreeable to PT; Pt in supine arrival, denies pain, eager to ambulate  General Comment  Comments: Pt retired to rest room, RN in room  Pain Screening  Patient Currently in Pain: No  Vital Signs  Patient Currently in Pain: No       Orientation  Orientation Overall Orientation Status: Within Functional Limits  Cognition      Objective   Bed mobility  Supine to Sit: Contact guard assistance  Sit to Supine: (pt retired to toilet seat.)  Scooting: Contact guard assistance  Transfers  Sit to Stand: Stand by assistance  Stand to sit: Contact guard assistance  Bed to Chair: Contact guard assistance  Comment: Pt is impulsive requiring cueing for safety. Ambulation  Ambulation?: Yes  Ambulation 1  Surface: level tile  Device: Rolling Walker  Other Apparatus: Wheelchair follow  Assistance: Minimal assistance  Quality of Gait: slightly impulsive, ataxic gait. difficulty with RLE clearance t/o  Gait Deviations: Shuffles;Staggers;Decreased step length;Decreased step height  Distance: 200ft  Comments: Pt drags on RLE when fatigue. Cuaing for safety requires and pt demo poor safety judgment and  mild denial of deficits. Stairs/Curb  Stairs?: No     Balance  Sitting - Static: Good;-  Sitting - Dynamic: Good;-  Standing - Static: Fair;+  Standing - Dynamic: Fair  Comments: standing balance assessed with RW  Exercises  Comments: defered d/t bam to use rest room. Goals  Short term goals  Time Frame for Short term goals: 14 visits  Short term goal 1: Ambulate 100' with walker SBA. Short term goal 2: Stand without UE support with CGA for 1 minute. Short term goal 3: Sit to/from stand with SBA. Patient Goals   Patient goals : Walk again.     Plan    Plan  Times per week: 5-6x/wk  Current Treatment Recommendations: Gait Training, Strengthening, Stair training, Balance Training, Functional Mobility Training, Endurance Training, Transfer Training, Safety Education & Training, Patient/Caregiver Education & Training  Safety Devices  Type of devices: Call light within reach, All fall risk precautions in place, Gait belt, Nurse notified(Left in toilet with RN in room)     Therapy Time   Individual Concurrent Group Co-treatment   Time In 1000         Time Out 1028         Minutes 28

## 2021-01-06 ENCOUNTER — FOLLOWUP TELEPHONE ENCOUNTER (OUTPATIENT)
Dept: PSYCHIATRY | Age: 46
End: 2021-01-06

## 2021-01-06 PROCEDURE — APPSS30 APP SPLIT SHARED TIME 16-30 MINUTES: Performed by: PHYSICIAN ASSISTANT

## 2021-01-06 PROCEDURE — 2500000003 HC RX 250 WO HCPCS: Performed by: NURSE PRACTITIONER

## 2021-01-06 PROCEDURE — 2580000003 HC RX 258: Performed by: PHYSICIAN ASSISTANT

## 2021-01-06 PROCEDURE — 6360000002 HC RX W HCPCS: Performed by: STUDENT IN AN ORGANIZED HEALTH CARE EDUCATION/TRAINING PROGRAM

## 2021-01-06 PROCEDURE — 97535 SELF CARE MNGMENT TRAINING: CPT

## 2021-01-06 PROCEDURE — 6370000000 HC RX 637 (ALT 250 FOR IP): Performed by: GENERAL PRACTICE

## 2021-01-06 PROCEDURE — 2000000000 HC ICU R&B

## 2021-01-06 PROCEDURE — 2580000003 HC RX 258: Performed by: STUDENT IN AN ORGANIZED HEALTH CARE EDUCATION/TRAINING PROGRAM

## 2021-01-06 PROCEDURE — 6360000002 HC RX W HCPCS

## 2021-01-06 RX ORDER — ONDANSETRON 2 MG/ML
INJECTION INTRAMUSCULAR; INTRAVENOUS
Status: COMPLETED
Start: 2021-01-06 | End: 2021-01-06

## 2021-01-06 RX ORDER — SODIUM CHLORIDE, SODIUM LACTATE, POTASSIUM CHLORIDE, AND CALCIUM CHLORIDE .6; .31; .03; .02 G/100ML; G/100ML; G/100ML; G/100ML
500 INJECTION, SOLUTION INTRAVENOUS ONCE
Status: COMPLETED | OUTPATIENT
Start: 2021-01-06 | End: 2021-01-06

## 2021-01-06 RX ORDER — ONDANSETRON 2 MG/ML
4 INJECTION INTRAMUSCULAR; INTRAVENOUS EVERY 6 HOURS PRN
Status: DISCONTINUED | OUTPATIENT
Start: 2021-01-06 | End: 2021-01-06

## 2021-01-06 RX ORDER — ACETAMINOPHEN 500 MG
1000 TABLET ORAL ONCE
Status: COMPLETED | OUTPATIENT
Start: 2021-01-07 | End: 2021-01-07

## 2021-01-06 RX ORDER — FENTANYL CITRATE 50 UG/ML
25 INJECTION, SOLUTION INTRAMUSCULAR; INTRAVENOUS ONCE
Status: DISCONTINUED | OUTPATIENT
Start: 2021-01-06 | End: 2021-01-06

## 2021-01-06 RX ADMIN — SODIUM CHLORIDE, POTASSIUM CHLORIDE, SODIUM LACTATE AND CALCIUM CHLORIDE 500 ML: 600; 310; 30; 20 INJECTION, SOLUTION INTRAVENOUS at 18:32

## 2021-01-06 RX ADMIN — ONDANSETRON 4 MG: 2 INJECTION INTRAMUSCULAR; INTRAVENOUS at 10:14

## 2021-01-06 RX ADMIN — SODIUM CHLORIDE, PRESERVATIVE FREE 10 ML: 5 INJECTION INTRAVENOUS at 23:00

## 2021-01-06 RX ADMIN — SODIUM CHLORIDE, PRESERVATIVE FREE 10 ML: 5 INJECTION INTRAVENOUS at 10:15

## 2021-01-06 RX ADMIN — Medication 2 MCG/MIN: at 05:01

## 2021-01-06 RX ADMIN — ENOXAPARIN SODIUM 30 MG: 30 INJECTION SUBCUTANEOUS at 08:30

## 2021-01-06 RX ADMIN — ENOXAPARIN SODIUM 30 MG: 30 INJECTION SUBCUTANEOUS at 21:05

## 2021-01-06 ASSESSMENT — PAIN SCALES - GENERAL
PAINLEVEL_OUTOF10: 5
PAINLEVEL_OUTOF10: 2
PAINLEVEL_OUTOF10: 10
PAINLEVEL_OUTOF10: 0
PAINLEVEL_OUTOF10: 2

## 2021-01-06 NOTE — PLAN OF CARE
Problem: Pain:  Goal: Pain level will decrease  Description: Pain level will decrease  Outcome: Ongoing  Goal: Control of acute pain  Description: Control of acute pain  Outcome: Ongoing  Goal: Control of chronic pain  Description: Control of chronic pain  Outcome: Ongoing     Problem: Skin Integrity:  Goal: Will show no infection signs and symptoms  Description: Will show no infection signs and symptoms  Outcome: Ongoing  Goal: Absence of new skin breakdown  Description: Absence of new skin breakdown  Outcome: Ongoing     Problem: Falls - Risk of:  Goal: Will remain free from falls  Description: Will remain free from falls  Outcome: Ongoing  Goal: Absence of physical injury  Description: Absence of physical injury  Outcome: Ongoing     Problem:  Activity:  Goal: Ability to tolerate increased activity will improve  Description: Ability to tolerate increased activity will improve  Outcome: Ongoing

## 2021-01-06 NOTE — PROGRESS NOTES
Clinician contacted patient to offer [facility] services. Patient expressed frustration with current care. Denied need for [facility] services at this time. Clinician encouraged patient to reach out at anytime if needs change.
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
## 2021-01-06 NOTE — PROGRESS NOTES
Physical Therapy  DATE: 2021    NAME: Dustin Anthony  MRN: [id number]   : 1975    Patient not seen this date for Physical Therapy due to:  [] Blood transfusion in progress  [] Hemodialysis  [x] Patient Declined stating he is not feeling well and that he is running a fever at the moment ; next schedule for 21  [] Spine Precautions   [] Strict Bedrest  [] Surgery/ Procedure  [] Testing      [] Other        [] PT is being discontinued at this time. Patient independent. No further needs. [] PT is being discontinued at this time due to declining physical/ medical status. Therapy is not appropriate at this time.     Reji Jacobs, PTA
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
## 2021-01-06 NOTE — PROGRESS NOTES
Patient c/o abrupt onset of of feeling pain, leg cramping, shaky , chilled. Temp normal.      Exam -    strength without change  Sitting edge of bed with toes flexed on floor with physio clonus (related to foot positioning- disappeared with foot flat and not evident on exam)  Anxious, tearful,   Temp normal  Offered IV x 1 pain med- he declined and calmed himself to return to baseline. Vomited x1 and stated he felt better. He attributes symptoms to zofran and requests it be dcd.

## 2021-01-06 NOTE — PROGRESS NOTES
ocOccupational Therapy  Facility/Department: [address] SIC  Daily Treatment Note  NAME: Dustin Anthony  : 1975  MRN: [id number]  Date of Service: 2021  Discharge Recommendations:  Patient would benefit from continued therapy after discharge, Pt. Is unsafe to return to [facility] at this time. Assessment   Performance deficits / Impairments: Decreased functional mobility ; Decreased ADL status; Decreased safe awareness;Decreased cognition;Decreased balance;Decreased high-level IADLs;Decreased endurance;Decreased strength;Decreased fine motor control;Decreased coordination  Prognosis: Good  Decision Making: Medium Complexity  OT Education: OT Role;Transfer Training;Precautions  Patient Education: purpose of activity; proper hand and foot placement; safety precautions;  Barriers to Learning: pt demo F carry over  REQUIRES OT FOLLOW UP: Yes  Activity Tolerance  Activity Tolerance: Patient Tolerated treatment well;Patient limited by fatigue  Safety Devices  Safety Devices in place: Yes  Type of devices: Gait belt;Patient at risk for falls;Call light within reach;Nurse notified; Left in bed  Restraints  Initially in place: No     Patient Diagnosis(es): The encounter diagnosis was Injury resulting from fall from height.    has no past medical history on file. has a past surgical history that includes Cervical disc surgery (2021) and cervical fusion (N/A, 2021). Restrictions  Restrictions/Precautions  Restrictions/Precautions: Fall Risk  Required Braces or Orthoses?: Yes  Required Braces or Orthoses  Cervical: c-collar  Position Activity Restriction  Other position/activity restrictions: Activity as tolerated; TLS clear; R groin ART line  Subjective   General  Chart Reviewed: Yes  Patient assessed for rehabilitation services?: Yes  Family / Caregiver Present: Yes(Girlfriend.)  General Comment  Comments: RN ok'd pt for OT tx this date.  Pt agreeable to session and motivated however significantly impulsive Pain Assessment  Non-Pharmaceutical Pain Intervention(s): Emotional support  Vital Signs  Patient Currently in Pain: Denies   Orientation  Orientation  Overall Orientation Status: Within Functional Limits  Objective    ADL  Additional Comments: Pt. unable to tolerate planned grooming/ADLs as a part of session d/t intense dizziness and felling like he was going to pass out after standing from EOB> ~1min. RN present and aware. Pt. reports he was able to use L hand to brush his teeth last night. Balance  Sitting Balance: Contact guard assistance  Standing Balance: Contact guard assistance  Standing Balance  Time: Sat EOB 12 minutes, Static stand ~1 minute  Activity: static standing  Comment: utilizing back of locked recliner chair for support with static stand per pt. request. \"I feel stable holding onto back of recliner chair\". Pt. very motivated to participate, however limited by dizziness d/t low BP 90/41 after stand, RN present and aware. Bed mobility  Rolling to Right: Contact guard assistance  Supine to Sit: Contact guard assistance  Sit to Supine: (Pt. retired to [employer] with RN and girlfriend present.)  Scooting: Contact guard assistance  Comment: Increased time and effort with bed rail. Transfers  Sit to stand: Contact guard assistance  Stand to sit: Contact guard assistance  Transfer Comments: Cues for B hand placment, increased time and effort. EOB->stand to back of recliner chair support bar. Cognition  Overall Cognitive Status: Exceptions  Following Commands: Follows multistep commands with repitition; Follows multistep commands with increased time  Attention Span: Attends with cues to redirect  Safety Judgement: Decreased awareness of need for assistance;Decreased awareness of need for safety  Problem Solving: Decreased awareness of errors  Insights: Decreased awareness of deficits  Initiation: Requires cues for some  Sequencing: Requires cues for some Cognition Comment: pt impulsive at increased risk for falls     Plan   Plan  Times per week: 4-5 x/wk  Current Treatment Recommendations: Strengthening, Balance Training, Functional Mobility Training, Endurance Training, Cognitive Reorientation, Safety Education & Training, Patient/Caregiver Education & Training, Equipment Evaluation, Education, & procurement, Self-Care / ADL, Home Management Training    Goals  Short term goals  Time Frame for Short term goals: By discharge, pt will:  Short term goal 1: Demo Min A for functional transfers and functional mobility with use of LRD PRN  Short term goal 2: Demo I with setup for UB ADLs and grooming tasks  Short term goal 3: Demo CGA with setup and AE PRN for LB ADLs and toileting tasks  Short term goal 4: Demo 8+ minutes dynamic standing task to increase balance for ADL completion  Short term goal 5: Demo good safety awareness throughout therapy session with <4 VCs       Therapy Time   Individual Concurrent Group Co-treatment   Time In 0958         Time Out 1029         Minutes 31         Timed Code Treatment Minutes: [address] [facility], R Ivana Ramírez

## 2021-01-06 NOTE — PLAN OF CARE
Problem: Pain:  Goal: Pain level will decrease  Description: Pain level will decrease  1/6/2021 0931 by Mosetta Merlin, RN  Outcome: Ongoing  1/6/2021 0035 by René Donald RN  Outcome: Ongoing  Goal: Control of acute pain  Description: Control of acute pain  1/6/2021 0931 by Mosetta Merlin, RN  Outcome: Ongoing  1/6/2021 0035 by René Donald RN  Outcome: Ongoing  Goal: Control of chronic pain  Description: Control of chronic pain  1/6/2021 0931 by Mosetta Merlin, RN  Outcome: Ongoing  1/6/2021 0035 by René Donald RN  Outcome: Ongoing     Problem: Skin Integrity:  Goal: Will show no infection signs and symptoms  Description: Will show no infection signs and symptoms  1/6/2021 0931 by Mosetta Merlin, RN  Outcome: Ongoing  1/6/2021 0035 by René Donald RN  Outcome: Ongoing  Goal: Absence of new skin breakdown  Description: Absence of new skin breakdown  1/6/2021 0931 by Mosetta Merlin, RN  Outcome: Ongoing  1/6/2021 0035 by René Donald RN  Outcome: Ongoing     Problem: Falls - Risk of:  Goal: Will remain free from falls  Description: Will remain free from falls  1/6/2021 0931 by Mosetta Merlin, RN  Outcome: Ongoing  1/6/2021 0035 by René Donald RN  Outcome: Ongoing  Goal: Absence of physical injury  Description: Absence of physical injury  1/6/2021 0931 by Mosetta Merlin, RN  Outcome: Ongoing  1/6/2021 0035 by René Donald RN  Outcome: Ongoing     Problem:  Activity:  Goal: Ability to tolerate increased activity will improve  Description: Ability to tolerate increased activity will improve  1/6/2021 0931 by Mosetta Merlin, RN  Outcome: Ongoing  1/6/2021 0035 by René Donald RN  Outcome: Ongoing

## 2021-01-07 PROBLEM — I95.1 ORTHOSTATIC HYPOTENSION: Status: ACTIVE | Noted: 2021-01-07

## 2021-01-07 LAB
ABSOLUTE EOS #: 0.03 K/UL (ref 0–0.44)
ABSOLUTE IMMATURE GRANULOCYTE: 0.03 K/UL (ref 0–0.3)
ABSOLUTE LYMPH #: 1.36 K/UL (ref 1.1–3.7)
ABSOLUTE MONO #: 0.47 K/UL (ref 0.1–1.2)
ANION GAP SERPL CALCULATED.3IONS-SCNC: 8 MMOL/L (ref 9–17)
BASOPHILS # BLD: 0 % (ref 0–2)
BASOPHILS ABSOLUTE: 0.03 K/UL (ref 0–0.2)
BUN BLDV-MCNC: 9 MG/DL (ref 6–20)
BUN/CREAT BLD: ABNORMAL (ref 9–20)
CALCIUM SERPL-MCNC: 8.2 MG/DL (ref 8.6–10.4)
CHLORIDE BLD-SCNC: 108 MMOL/L (ref 98–107)
CO2: 24 MMOL/L (ref 20–31)
CREAT SERPL-MCNC: 0.77 MG/DL (ref 0.7–1.2)
DIFFERENTIAL TYPE: ABNORMAL
EOSINOPHILS RELATIVE PERCENT: 0 % (ref 1–4)
GFR AFRICAN AMERICAN: >60 ML/MIN
GFR NON-AFRICAN AMERICAN: >60 ML/MIN
GFR SERPL CREATININE-BSD FRML MDRD: ABNORMAL ML/MIN/{1.73_M2}
GFR SERPL CREATININE-BSD FRML MDRD: ABNORMAL ML/MIN/{1.73_M2}
GLUCOSE BLD-MCNC: 112 MG/DL (ref 70–99)
HCT VFR BLD CALC: 41.6 % (ref 40.7–50.3)
HEMOGLOBIN: 13.7 G/DL (ref 13–17)
IMMATURE GRANULOCYTES: 0 %
LYMPHOCYTES # BLD: 18 % (ref 24–43)
MCH RBC QN AUTO: 31.6 PG (ref 25.2–33.5)
MCHC RBC AUTO-ENTMCNC: 32.9 G/DL (ref 28.4–34.8)
MCV RBC AUTO: 96.1 FL (ref 82.6–102.9)
MONOCYTES # BLD: 6 % (ref 3–12)
NRBC AUTOMATED: 0 PER 100 WBC
PDW BLD-RTO: 12.3 % (ref 11.8–14.4)
PLATELET # BLD: 201 K/UL (ref 138–453)
PLATELET ESTIMATE: ABNORMAL
PMV BLD AUTO: 9.6 FL (ref 8.1–13.5)
POTASSIUM SERPL-SCNC: 3.9 MMOL/L (ref 3.7–5.3)
RBC # BLD: 4.33 M/UL (ref 4.21–5.77)
RBC # BLD: ABNORMAL 10*6/UL
SEG NEUTROPHILS: 75 % (ref 36–65)
SEGMENTED NEUTROPHILS ABSOLUTE COUNT: 5.8 K/UL (ref 1.5–8.1)
SODIUM BLD-SCNC: 140 MMOL/L (ref 135–144)
WBC # BLD: 7.7 K/UL (ref 3.5–11.3)
WBC # BLD: ABNORMAL 10*3/UL

## 2021-01-07 PROCEDURE — 94761 N-INVAS EAR/PLS OXIMETRY MLT: CPT

## 2021-01-07 PROCEDURE — 6370000000 HC RX 637 (ALT 250 FOR IP): Performed by: STUDENT IN AN ORGANIZED HEALTH CARE EDUCATION/TRAINING PROGRAM

## 2021-01-07 PROCEDURE — 85025 COMPLETE CBC W/AUTO DIFF WBC: CPT

## 2021-01-07 PROCEDURE — 6360000002 HC RX W HCPCS: Performed by: STUDENT IN AN ORGANIZED HEALTH CARE EDUCATION/TRAINING PROGRAM

## 2021-01-07 PROCEDURE — 6370000000 HC RX 637 (ALT 250 FOR IP): Performed by: GENERAL PRACTICE

## 2021-01-07 PROCEDURE — 80048 BASIC METABOLIC PNL TOTAL CA: CPT

## 2021-01-07 PROCEDURE — 2000000000 HC ICU R&B

## 2021-01-07 PROCEDURE — 2580000003 HC RX 258: Performed by: STUDENT IN AN ORGANIZED HEALTH CARE EDUCATION/TRAINING PROGRAM

## 2021-01-07 PROCEDURE — 2580000003 HC RX 258: Performed by: PHYSICIAN ASSISTANT

## 2021-01-07 PROCEDURE — APPSS30 APP SPLIT SHARED TIME 16-30 MINUTES: Performed by: PHYSICIAN ASSISTANT

## 2021-01-07 RX ORDER — SODIUM CHLORIDE, SODIUM LACTATE, POTASSIUM CHLORIDE, AND CALCIUM CHLORIDE .6; .31; .03; .02 G/100ML; G/100ML; G/100ML; G/100ML
1000 INJECTION, SOLUTION INTRAVENOUS ONCE
Status: COMPLETED | OUTPATIENT
Start: 2021-01-07 | End: 2021-01-07

## 2021-01-07 RX ORDER — CALCIUM CARBONATE 200(500)MG
500 TABLET,CHEWABLE ORAL 3 TIMES DAILY PRN
Status: DISCONTINUED | OUTPATIENT
Start: 2021-01-07 | End: 2021-01-09 | Stop reason: HOSPADM

## 2021-01-07 RX ORDER — MORPHINE SULFATE 2 MG/ML
2 INJECTION, SOLUTION INTRAMUSCULAR; INTRAVENOUS ONCE
Status: DISCONTINUED | OUTPATIENT
Start: 2021-01-07 | End: 2021-01-07

## 2021-01-07 RX ADMIN — IBUPROFEN 400 MG: 400 TABLET, FILM COATED ORAL at 16:31

## 2021-01-07 RX ADMIN — ENOXAPARIN SODIUM 30 MG: 30 INJECTION SUBCUTANEOUS at 21:49

## 2021-01-07 RX ADMIN — SODIUM CHLORIDE, POTASSIUM CHLORIDE, SODIUM LACTATE AND CALCIUM CHLORIDE 1000 ML: 600; 310; 30; 20 INJECTION, SOLUTION INTRAVENOUS at 15:19

## 2021-01-07 RX ADMIN — SODIUM CHLORIDE, PRESERVATIVE FREE 10 ML: 5 INJECTION INTRAVENOUS at 07:33

## 2021-01-07 RX ADMIN — SODIUM CHLORIDE, PRESERVATIVE FREE 10 ML: 5 INJECTION INTRAVENOUS at 21:49

## 2021-01-07 RX ADMIN — SODIUM CHLORIDE, POTASSIUM CHLORIDE, SODIUM LACTATE AND CALCIUM CHLORIDE 1000 ML: 600; 310; 30; 20 INJECTION, SOLUTION INTRAVENOUS at 10:58

## 2021-01-07 RX ADMIN — ENOXAPARIN SODIUM 30 MG: 30 INJECTION SUBCUTANEOUS at 07:33

## 2021-01-07 RX ADMIN — IBUPROFEN 400 MG: 400 TABLET, FILM COATED ORAL at 07:33

## 2021-01-07 RX ADMIN — ACETAMINOPHEN 1000 MG: 500 TABLET ORAL at 00:03

## 2021-01-07 ASSESSMENT — PAIN SCALES - GENERAL
PAINLEVEL_OUTOF10: 0
PAINLEVEL_OUTOF10: 0
PAINLEVEL_OUTOF10: 5

## 2021-01-07 ASSESSMENT — PAIN DESCRIPTION - DESCRIPTORS: DESCRIPTORS: DISCOMFORT;PATIENT UNABLE TO DESCRIBE

## 2021-01-07 ASSESSMENT — PAIN DESCRIPTION - LOCATION: LOCATION: GENERALIZED

## 2021-01-07 NOTE — PROGRESS NOTES
Physical Therapy  DATE: 2021    NAME: Jewel Byers  MRN: [id number]   : 1975    Patient not seen this date for Physical Therapy due to:  [] Blood transfusion in progress  [] Hemodialysis  [x] Patient Declined stating he doesn't`t feel \"medically safe\" participating with therapy this date   [] Spine Precautions   [] Strict Bedrest  [] Surgery/ Procedure  [] Testing      [x] Other; RN confirms Pt is +tive for hypotension        [] PT is being discontinued at this time. Patient independent. No further needs. [] PT is being discontinued at this time due to declining physical/ medical status. Therapy is not appropriate at this time.     Emma Humphrey, PTA

## 2021-01-07 NOTE — FLOWSHEET NOTE
Resting in bed, awake. Expresses frustration & \"pressure\" about the decision for rehab placement. Information @ bedside that he received from case management. He declines when I offered to assist to research information for him. He declines & said he'll look into tonight with his phone. Significant other Lynn (a retired RN) went to room @ [facility] for the night. Vs & assessment obtained. Levophed remains @ 2 mcg/min via Rt femoral CVC to maintain MAP>80. Temp is 99.5 po. Skin is pink, warm & dry. Reports feeling cold & \"My legs are so cold\". Bear hugger blanket turned back up to 38. Pt stated if he didn't get warm he would be \"hurting all over again\". Currently denies need for pain medicine. C-Spine precautions maintained with Aspen collar in place. Anterior neck incision is healing with steri strips C,D,I. Encouraged to use Incentive spirometer, with teaching about prevention of atelectasis/pna. Pt verbalizes understanding, but said to put it @ bedside & he will do it when he feels better. 2100-Trauma residents here @ the bedside. Updated on current status. Pt reports he was able to eat cereal this afternoon & retained.

## 2021-01-07 NOTE — PROGRESS NOTES
ICU PROGRESS NOTE    PATIENT NAME: Gene Ortega  MEDICAL RECORD NO. [id number]  DATE: 1/7/2021    PRIMARY CARE PHYSICIAN: Sonam Siddiqi, DO    HD: # 6    ASSESSMENT    Patient Active Problem List   Diagnosis    Fall from, out of or through [facility], initial encounter    Fall from [facility]    C7 cervical fracture [facility])       MEDICAL DECISION MAKING AND PLAN    1. Neuro/Pain  C6/C7 fracture  POD 6 s/p C6/7 discectomy and fusion with CSF leak repair  -Pain: tylenol, ibuprofen, refusing robaxin, neurontin, and norco  -Sensation intact bilateral upper and lowers, improving strength in RUE and RLE  -Keep cervical collar in place    -PT/OT. PMNR consulted. -MAP goal lowered to 80 per NS. End date 1/8.    2. CV  -HR 50-60's, MAP goal > 80. Achieving MAP requiring pressors overnight. 3 Levophed. Jamilah Fuse to the upper 30s while sleeping, asymptomatic.   -discontinued Midodrine 10mg q8  -orthostatic hypotension, 120/90/70, 1L fluid bolus and encouraged PO increase    3. Heme (last labs 1/5)  -Hgb 13.7,12.3,12.7  -Plts 201, 211, 248    4. Pulm  -Bilateral pulmonary contusions  -SAT mid to upper 90's on RA  -Encourage IS use hourly, 1200 max    5. Renal   -No valdez, urinating on own  -Lytes: 140/3.9/108/24  -Bun: 9/0.77  -I/O: 2.3/3.1, urine: 3.1   -BUN 12/0.61    6. GI   -Regular diet, ensure shakes   -Unmeasured BM 1/5: colace, glycolax  -2x unmeasured emesis 1/6    7. ID  -Tmax 38.5 for once occurrence, resolved with linda hugger removal and tylenol  -WBC 7.7->8.7  -No infectious concerns at current     8. Endo  -Glucose 112, no insulin requirements     9. Lines  -PIV  -R fem a line to remain, pt required pressors overnight  -R fem CVC    10. Proph  -DVT: lovenox 30mg BID   -GI: tolerating diet, not indicated    11.  Dispo  -Remain in ICU today, requires pressors      CHECKLIST  RASS: -1+1  RESTRAINTS: None  IVF: none, tolerating diet   NUTRITION: regular diet  ANTIBIOTICS: none  GI: not indicated   DVT: lovenox GLYCEMIC CONTROL: BG controlled, no insulin requirements     SUBJECTIVE    Jaspal Juarez is seen and examined at bedside. Afebrile, VSS. Pressor requirements overnight to achieve MAP goal >80. Pt refusing MMPT secondary to nausea. Fever overnight corrected after linda removed and tylenol. Pt able to eat cereal yesterday and keep it down without n/v.      OBJECTIVE  VITALS: Temp: Temp: 98.2 °F (36.8 °C)Temp  Av.6 °F (37.6 °C)  Min: 96.6 °F (35.9 °C)  Max: 102.6 °F (44.9 °C) BP Systolic (15JTE), PKV:458 , Min:67 , OCW:174   Diastolic (21NHF), HEV:25, Min:33, Max:76   Pulse Pulse  Av.7  Min: 42  Max: 90 Resp Resp  Av.8  Min: 13  Max: 25 Pulse ox SpO2  Av %  Min: 84 %  Max: 100 %    GENERAL: alert and oriented, no distress  NEURO: positive findings: muscular weakness of the right upper and lower extremity, reflexes intact bilaterally extremities. Reported paresthesias L upper extremity improved. Achilles, patellar, and brachioradialis reflexes intact 2/4. HEAD: normocephalic, atraumatic   EYES: sclera clear, pupils equal and reactive   ENT: moist mucous membranes, cervical collar in place   LUNGS: normal effort on RA, no respiratory distress, no accessory muscle use   HEART: normal rate and regular rhythm  ABDOMEN: soft, non-tender, non-distended, no guarding or peritoneal signs   EXTREMITY:  no cyanosis, clubbing or edema.  +3/5 strength R wrist, +3/5 strength with plantar/dorsiflexion of R foot   +5/5 motor strength to left upper and left lower extremity   SKIN: normal coloration and turgor     LAB:  CBC:   Recent Labs     21  0514 21  0443   WBC 8.7 7.7   HGB 12.3* 13.7   HCT 38.2* 41.6   MCV 97.2 96.1    201     BMP:   Recent Labs     21  0514 21  0443    140   K 4.1 3.9    108*   CO2 24 24   BUN 12 9   CREATININE 0.61* 0.77   GLUCOSE 90 112*       RADIOLOGY:  No new imaging      Lyric Filter, DO
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
## 2021-01-07 NOTE — PROGRESS NOTES
Spoke with patient and significant other regarding plans for discharge. They are continuing to do their own research and making calls to acute rehab facilities. Offers to assist and make referrals on his behalf are refused at this time until \"we narrow it down\". They are informed they are not obligated to go to a facility just because a referral has been made. They prefer to find the facilites they want referrals to go to first.  The pt reports he is looking for a program that offers aquatic therapy as part of the rehab. Edwina Resendiz, ICU  updated.

## 2021-01-08 LAB
ABSOLUTE EOS #: 0.25 K/UL (ref 0–0.44)
ABSOLUTE IMMATURE GRANULOCYTE: <0.03 K/UL (ref 0–0.3)
ABSOLUTE LYMPH #: 1.42 K/UL (ref 1.1–3.7)
ABSOLUTE MONO #: 0.51 K/UL (ref 0.1–1.2)
ANION GAP SERPL CALCULATED.3IONS-SCNC: 10 MMOL/L (ref 9–17)
BASOPHILS # BLD: 1 % (ref 0–2)
BASOPHILS ABSOLUTE: 0.05 K/UL (ref 0–0.2)
BUN BLDV-MCNC: 8 MG/DL (ref 6–20)
BUN/CREAT BLD: ABNORMAL (ref 9–20)
CALCIUM SERPL-MCNC: 8.5 MG/DL (ref 8.6–10.4)
CHLORIDE BLD-SCNC: 106 MMOL/L (ref 98–107)
CO2: 22 MMOL/L (ref 20–31)
CREAT SERPL-MCNC: 0.68 MG/DL (ref 0.7–1.2)
DIFFERENTIAL TYPE: ABNORMAL
EOSINOPHILS RELATIVE PERCENT: 5 % (ref 1–4)
GFR AFRICAN AMERICAN: >60 ML/MIN
GFR NON-AFRICAN AMERICAN: >60 ML/MIN
GFR SERPL CREATININE-BSD FRML MDRD: ABNORMAL ML/MIN/{1.73_M2}
GFR SERPL CREATININE-BSD FRML MDRD: ABNORMAL ML/MIN/{1.73_M2}
GLUCOSE BLD-MCNC: 91 MG/DL (ref 70–99)
HCT VFR BLD CALC: 48.5 % (ref 40.7–50.3)
HEMOGLOBIN: 16 G/DL (ref 13–17)
IMMATURE GRANULOCYTES: 0 %
LYMPHOCYTES # BLD: 27 % (ref 24–43)
MCH RBC QN AUTO: 31.4 PG (ref 25.2–33.5)
MCHC RBC AUTO-ENTMCNC: 33 G/DL (ref 28.4–34.8)
MCV RBC AUTO: 95.1 FL (ref 82.6–102.9)
MONOCYTES # BLD: 10 % (ref 3–12)
NRBC AUTOMATED: 0 PER 100 WBC
PDW BLD-RTO: 12.5 % (ref 11.8–14.4)
PLATELET # BLD: ABNORMAL K/UL (ref 138–453)
PLATELET ESTIMATE: ABNORMAL
PLATELET, FLUORESCENCE: 158 K/UL (ref 138–453)
PLATELET, IMMATURE FRACTION: 2 % (ref 1.1–10.3)
PMV BLD AUTO: ABNORMAL FL (ref 8.1–13.5)
POTASSIUM SERPL-SCNC: 4.6 MMOL/L (ref 3.7–5.3)
RBC # BLD: 5.1 M/UL (ref 4.21–5.77)
RBC # BLD: ABNORMAL 10*6/UL
SEG NEUTROPHILS: 57 % (ref 36–65)
SEGMENTED NEUTROPHILS ABSOLUTE COUNT: 2.97 K/UL (ref 1.5–8.1)
SODIUM BLD-SCNC: 138 MMOL/L (ref 135–144)
WBC # BLD: 5.2 K/UL (ref 3.5–11.3)
WBC # BLD: ABNORMAL 10*3/UL

## 2021-01-08 PROCEDURE — 6360000002 HC RX W HCPCS: Performed by: STUDENT IN AN ORGANIZED HEALTH CARE EDUCATION/TRAINING PROGRAM

## 2021-01-08 PROCEDURE — 94761 N-INVAS EAR/PLS OXIMETRY MLT: CPT

## 2021-01-08 PROCEDURE — 6370000000 HC RX 637 (ALT 250 FOR IP): Performed by: GENERAL PRACTICE

## 2021-01-08 PROCEDURE — APPSS30 APP SPLIT SHARED TIME 16-30 MINUTES: Performed by: PHYSICIAN ASSISTANT

## 2021-01-08 PROCEDURE — 6370000000 HC RX 637 (ALT 250 FOR IP): Performed by: NURSE PRACTITIONER

## 2021-01-08 PROCEDURE — 80048 BASIC METABOLIC PNL TOTAL CA: CPT

## 2021-01-08 PROCEDURE — 1200000000 HC SEMI PRIVATE

## 2021-01-08 PROCEDURE — 85055 RETICULATED PLATELET ASSAY: CPT

## 2021-01-08 PROCEDURE — 6370000000 HC RX 637 (ALT 250 FOR IP): Performed by: STUDENT IN AN ORGANIZED HEALTH CARE EDUCATION/TRAINING PROGRAM

## 2021-01-08 PROCEDURE — 85025 COMPLETE CBC W/AUTO DIFF WBC: CPT

## 2021-01-08 PROCEDURE — 97535 SELF CARE MNGMENT TRAINING: CPT

## 2021-01-08 RX ORDER — ACETAMINOPHEN 325 MG/1
650 TABLET ORAL EVERY 4 HOURS PRN
Status: DISCONTINUED | OUTPATIENT
Start: 2021-01-08 | End: 2021-01-09 | Stop reason: HOSPADM

## 2021-01-08 RX ORDER — METHOCARBAMOL 500 MG/1
500 TABLET, FILM COATED ORAL EVERY 6 HOURS
Status: DISCONTINUED | OUTPATIENT
Start: 2021-01-08 | End: 2021-01-09 | Stop reason: HOSPADM

## 2021-01-08 RX ADMIN — ENOXAPARIN SODIUM 30 MG: 30 INJECTION SUBCUTANEOUS at 20:57

## 2021-01-08 RX ADMIN — ENOXAPARIN SODIUM 30 MG: 30 INJECTION SUBCUTANEOUS at 07:27

## 2021-01-08 RX ADMIN — METHOCARBAMOL TABLETS 500 MG: 500 TABLET, COATED ORAL at 18:37

## 2021-01-08 RX ADMIN — DOCUSATE SODIUM 100 MG: 100 CAPSULE, LIQUID FILLED ORAL at 07:28

## 2021-01-08 RX ADMIN — POLYETHYLENE GLYCOL 3350 17 G: 17 POWDER, FOR SOLUTION ORAL at 07:26

## 2021-01-08 RX ADMIN — IBUPROFEN 400 MG: 400 TABLET, FILM COATED ORAL at 17:03

## 2021-01-08 RX ADMIN — IBUPROFEN 400 MG: 400 TABLET, FILM COATED ORAL at 07:28

## 2021-01-08 RX ADMIN — ACETAMINOPHEN 650 MG: 325 TABLET ORAL at 07:26

## 2021-01-08 ASSESSMENT — PAIN DESCRIPTION - PAIN TYPE: TYPE: ACUTE PAIN

## 2021-01-08 ASSESSMENT — PAIN SCALES - GENERAL
PAINLEVEL_OUTOF10: 0
PAINLEVEL_OUTOF10: 3
PAINLEVEL_OUTOF10: 3

## 2021-01-08 NOTE — PROGRESS NOTES
Report received from [facility]. Patient comfortable in bed/ Assessment unchanged. Will continue to follow.

## 2021-01-08 NOTE — PROGRESS NOTES
Comprehensive Nutrition Assessment    Type and Reason for Visit:  Initial (LOS)    Nutrition Recommendations/Plan: Continue Current Diet. Encourage PO intake as tolerated. Remove ONS from meal trays per pt request; not willing to try a different supplement at present. Will monitor PO tolerance/adequacy of intake. Nutrition Assessment:  Chart reviewed d/t length of stay. Pt admitted with cervical cord compression secondary to traumatic disk rupture and fracture of C6-C7. POD #7 s/p anterior cervical discectomy and fusion C6-C7. Pt reports his appetite fluctuates. Was drinking the Ensure supplements well, but now feels his appetite is improving so he is not drinking at present (several unopened bottles in room). States he would like removed from meal trays at this time; pt will ask for supplements if needed. Malnutrition Assessment:  Malnutrition Status: At risk for malnutrition     Context:  Acute Illness     Findings of the 6 clinical characteristics of malnutrition:  Energy Intake:  1 - 75% or less of estimated energy requirements for 7 or more days  Weight Loss:  No significant weight loss     Body Fat Loss:  No significant body fat loss     Muscle Mass Loss:  No significant muscle mass loss    Fluid Accumulation:  No significant fluid accumulation     Strength:  Not Performed    Estimated Daily Nutrient Needs:  Energy (kcal):  2716-7576 kcal/day; Weight Used for Energy Requirements:  Current     Protein (g):  120 g pro/day; Weight Used for Protein Requirements:  Ideal(1.5)          Current Nutrition Therapies:    DIET GENERAL;  Dietary Nutrition Supplements:   Meal/Supplement Intake: variable     Anthropometric Measures:  · Height: 5' 11\" (180.3 cm)  · Current Body Weight: 188 lb 11.4 oz (85.6 kg)   · Ideal Body Weight: 172 lbs; % Ideal Body Weight 109%  · BMI: 26.3  · BMI Categories: Overweight (BMI 25.0-29. 9)       Nutrition Diagnosis: · Inadequate oral intake related to current medical condition, decreased appetite as evidenced by variable meal intake    Nutrition Interventions:   Food and/or Nutrient Delivery:  Continue Current Diet. Encourage PO intake as tolerated. Remove ONS from meal trays per pt request; not willing to try a different supplement at present. Nutrition Education/Counseling:  No recommendation at this time   Coordination of Nutrition Care:  Continue to monitor while inpatient    Goals:  meet greater than or equal to 75% of estimated nutrition needs with oral diet       Nutrition Monitoring and Evaluation:   Food/Nutrient Intake Outcomes:  Food and Nutrient Intake  Physical Signs/Symptoms Outcomes:  Biochemical Data, Nutrition Focused Physical Findings, Skin, Weight     Discharge Planning:     Too soon to determine     Electronically signed by Suzanne Dumont RD, LD on 1/8/21 at 11:17 AM EST    Contact: [phone]

## 2021-01-08 NOTE — PROGRESS NOTES
Writer noted blood spot on gown. Assessed femoral site and noted the site was freshly leaking. Patient refusing dressing change at this time stating that he is \"supposed to have them removed today. \" Also, refusing IV placement at this time too. Will continue to monitor.

## 2021-01-08 NOTE — PROGRESS NOTES
Occupational Therapy  Facility/Department: [address]  Daily Treatment Note  NAME: Mati Díaz  : 1975  MRN: [id number]  Date of Service: 2021  Discharge Recommendations:  Patient would benefit from continued therapy after discharge, Pt. would benefit from 3 hours of therapy a day 5 days a week or 15 hours of therapy over a 7 day period. OT Equipment Recommendations  Other: CTA  Assessment   Performance deficits / Impairments: Decreased functional mobility ; Decreased ADL status; Decreased safe awareness;Decreased cognition;Decreased balance;Decreased high-level IADLs;Decreased endurance;Decreased strength;Decreased fine motor control;Decreased coordination  Prognosis: Good  Decision Making: Medium Complexity  OT Education: OT Role;Transfer Training;Precautions  Patient Education: purpose of activity; proper hand and foot placement; safety precautions;  Barriers to Learning: pt demo G carry over  REQUIRES OT FOLLOW UP: Yes  Activity Tolerance  Activity Tolerance: Patient Tolerated treatment well;Patient limited by fatigue  Activity Tolerance: Pt. requesting a new Thera band d/t the one he has is to short and a stronger  strengthening hand gripper d/t his pink sponge not that strong. Writer provided pt. with new Thera band (green- med resistance) and a black handled  strengthener. Pt. and girlfriend educated on use, per pt. he was familiar with both the Thera band and  strengthener and was grateful for items as pt. is eager to improve overall strength. Safety Devices  Safety Devices in place: Yes  Type of devices: Gait belt;Patient at risk for falls;Call light within reach;Nurse notified; Left in chair  Restraints  Initially in place: No     Patient Diagnosis(es): The encounter diagnosis was Injury resulting from fall from height.    has no past medical history on file. has a past surgical history that includes Cervical disc surgery (2021) and cervical fusion (N/A, 2021). Restrictions  Restrictions/Precautions  Restrictions/Precautions: Fall Risk  Required Braces or Orthoses?: Yes  Required Braces or Orthoses  Cervical: c-collar  Position Activity Restriction  Other position/activity restrictions: Activity as tolerated; TLS clear; R groin ART line  Subjective   General  Chart Reviewed: Yes  Patient assessed for rehabilitation services?: Yes  Family / Caregiver Present: Yes(Girlfriend present and supportive.)  General Comment  Comments: RN ok'd pt for OT tx this date. Pt agreeable to session and motivated however impulsive. Pain Assessment  Pain Level: 3  Pain Type: Acute pain  Pain Location: Generalized  Non-Pharmaceutical Pain Intervention(s): Emotional support  Vital Signs  Patient Currently in Pain: Yes   Orientation  Orientation  Overall Orientation Status: Within Functional Limits  Objective    ADL  Feeding: Setup;Verbal cueing; Increased time to complete;Stand by assistance  Additional Comments: Needing A to open lids and containers with breakfast. Pt. was able to tolerated feeding self with regular utensils after set up sitting in recliner with B feet on floor. Pt. very eager to shower, however prefers to shower with assistance from girlfriend not WHELAN/L, per RN pt. okay to shower this am.  Balance  Sitting Balance: Supervision  Standing Balance: Contact guard assistance  Standing Balance  Time: 5 minutes  Activity: Static standing/functional mobility  Comment: Utilizing back of locked recliner chair for support with static stand initially per pt. request. Once pt. feels stable he uses RW for support with standing and functional mobility. Functional Mobility  Functional - Mobility Device: Rolling Walker  Activity: Other(House hold distances with RW)  Assist Level: Contact guard assistance  Functional Mobility Comments: Difficulty noted RLE, CGA throughout with RW. Pt. very focused on functional mobility this date.   Bed mobility  Rolling to Right: Contact guard assistance Supine to Sit: Minimal assistance  Scooting: Stand by assistance  Comment: Increased time and effort. Transfers  Stand Step Transfers: Contact guard assistance  Sit to stand: Contact guard assistance  Stand to sit: Contact guard assistance  Transfer Comments: Cues for B hand placement, increased time and effort. EOB->stand to back of recliner chair support bar. Pt. in supine position upon arrival, pt. retired to recliner chair with B feet on floor to eat breakfast.   Cognition  Overall Cognitive Status: Exceptions  Following Commands:  Follows all commands without difficulty  Safety Judgement: Decreased awareness of need for assistance  Insights: Decreased awareness of deficits  Initiation: Does not require cues  Sequencing: Does not require cues  Cognition Comment: pt impulsive at increased risk for falls  Plan   Plan  Times per week: 4-5 x/wk  Current Treatment Recommendations: Strengthening, Balance Training, Functional Mobility Training, Endurance Training, Cognitive Reorientation, Safety Education & Training, Patient/Caregiver Education & Training, Equipment Evaluation, Education, & procurement, Self-Care / ADL, Home Management Training    Goals  Short term goals  Time Frame for Short term goals: By discharge, pt will:  Short term goal 1: Demo Min A for functional transfers and functional mobility with use of LRD PRN  Short term goal 2: Demo I with setup for UB ADLs and grooming tasks  Short term goal 3: Demo CGA with setup and AE PRN for LB ADLs and toileting tasks  Short term goal 4: Demo 8+ minutes dynamic standing task to increase balance for ADL completion  Short term goal 5: Demo good safety awareness throughout therapy session with <4 VCs       Therapy Time   Individual Concurrent Group Co-treatment   Time In 0825         Time Out 0909         Minutes 44         Timed Code Treatment Minutes: [address] [facility], WHELAN/

## 2021-01-08 NOTE — PROGRESS NOTES
Neurosurgery RONEL/Resident    Daily Progress Note   No chief complaint on file. 1/8/2021  9:26 AM    Chart reviewed. No acute events overnight. No new complaints. Resting comfortably in bedside chair with aspen collar in place. Afebrile. Vitals:    01/08/21 0700 01/08/21 0715 01/08/21 0730 01/08/21 0755   BP:    132/78   Pulse: 50 53 57 56   Resp: 17 17 17 22   Temp:   98.8 °F (37.1 °C)    TempSrc:   Oral    SpO2: 100% 98% 99% 99%   Weight:       Height:         PE:   AOx3   CNII-XII intact   PERRL, EOMI   Motor   L deltoid 5/5; R deltoid 5/5  L biceps 5/5; R biceps 5/5  L triceps 5/5; R triceps 5/5  L wrist extension 5/5; R wrist extension 5/5  L HG 5/5; R HG 4/5      L iliopsoas 5/5 , R iliopsoas 5/5  L quadriceps 5/5; R quadriceps 5/5  L Dorsiflexion 5/5; R dorsiflexion 5/5  L Plantarflexion 5/5; R plantarflexion 5/5  L EHL 5/5; R EHL 5/5    Sensation altered from sensory level at chest down to feet      Incision open to air, area is clean dry and intact       Lab Results   Component Value Date    WBC 5.2 01/08/2021    HGB 16.0 01/08/2021    HCT 48.5 01/08/2021    PLT See Reflexed IPF Result 01/08/2021    ALT 21 01/02/2021    AST 22 01/02/2021     01/08/2021    K 4.6 01/08/2021     01/08/2021    CREATININE 0.68 (L) 01/08/2021    BUN 8 01/08/2021    CO2 22 01/08/2021    INR 1.0 01/02/2021       A/P  39 y. o. male who presents with cervical cord compression secondary to traumatic disk rupture and fracture of C6-C7  POD #7 s/p anterior cervical discectomy and fusion C6-C7     - PT and OT for mobilization  - Keep cervical collar on at all time so there than eating and hygiene purposes , place back on after   - Ok for discharge from [facility]  - Follow up outpatient with Dr. David Chavez in 1 week    Please contact neurosurgery with any changes in patients neurologic status.          TOBY Johnston   9:26 AM EST

## 2021-01-08 NOTE — PROGRESS NOTES
ICU PROGRESS NOTE    PATIENT NAME: Daryl Lagunas  MEDICAL RECORD NO. [id number]  DATE: 1/8/2021    PRIMARY CARE PHYSICIAN: Clara Clark, DO    HD: # 7    ASSESSMENT    Patient Active Problem List   Diagnosis    [facility] out of or through [facility], initial encounter    [facility] from [facility]    C7 cervical fracture ([facility] [address]    Orthostatic hypotension       MEDICAL DECISION MAKING AND PLAN    1. Neuro/Pain  C6/C7 fracture  POD 6 s/p C6/7 discectomy and fusion with CSF leak repair  -Pain: tylenol, ibuprofen, refusing robaxin, neurontin, and norco  -Sensation intact bilateral upper and lowers, improving strength in RUE and RLE  -Keep cervical collar in place    -PT/OT. PMNR consulted. -MAP goal lowered to 80 per NS. End date 1/8.  -F/u with NS in one week. 2. CV  -HR 50-60's, MAP goal > 80 completed today. Pt achieved MAP overnight w/o Levophed. -orthostatic hypotension treated with fluids, symptomatic and objective improvement of 10mmHg, encourage PO increase    3. Heme   -Hgb 60, 13.7,12.3,12.7  -Plts 158, 201, 211, 248    4. Pulm  -Bilateral pulmonary contusions  -SAT mid to upper 90's on RA  -Encourage IS use hourly, 1500    5. Renal   -No valdez, urinating on own  -Lytes: 138/4.6/106/22->140/3.9/108/24  -Bun: 8/0.68->9/0.77  -I/O:4.3/1.7, urine: 1.7, Since admit -1934     6. GI   -Regular diet, ensure shakes   -Unmeasured BM 1/6: colace, glycolax  -No episodes of emesis    7. ID  -afebrile  -WBC 5.2, 7.7->8.7  -No infectious concerns at current     8. Endo  -Glucose 91, no insulin requirements     9. Lines  -PIV  -R fem a line to be removed today  -R fem CVC to be removed today    10. Proph  -DVT: lovenox 30mg BID   -GI: tolerating diet, not indicated    11.  Dispo  -Transfer out of ICU      CHECKLIST  RASS: -1+1  RESTRAINTS: None  IVF: none, tolerating diet   NUTRITION: regular diet  ANTIBIOTICS: none  GI: not indicated   DVT: lovenox  GLYCEMIC CONTROL: BG controlled, no insulin requirements     SUBJECTIVE Paula Gamboa is seen and examined at bedside. Afebrile, VSS. Pressor requirements no longer needed overnight to achieve MAP goal >80. Afebrile overnight. Pt able to eat cereal yesterday and keep it down without n/v. Working with significant other for placement in rehab facility. OBJECTIVE  VITALS: Temp: Temp: 98.9 °F (37.2 °C)Temp  Av.6 °F (37 °C)  Min: 98 °F (36.7 °C)  Max: 98.9 °F (88.2 °C) BP Systolic (88DQL), LMF:965 , Min:66 , NIT:373   Diastolic (84UOD), XTU:22, Min:37, Max:91   Pulse Pulse  Av.4  Min: 47  Max: 73 Resp Resp  Av.7  Min: 10  Max: 24 Pulse ox SpO2  Av.3 %  Min: 79 %  Max: 100 %    GENERAL: alert and oriented, no distress  NEURO: positive findings: muscular weakness of the right upper and lower extremity, reflexes intact bilateral extremities. Achilles, patellar, and brachioradialis reflexes intact 2/4. HEAD: normocephalic, atraumatic   EYES: sclera clear, pupils equal and reactive   ENT: moist mucous membranes, cervical collar in place   LUNGS: normal effort on RA, no respiratory distress, no accessory muscle use   HEART: normal rate and regular rhythm  ABDOMEN: soft, non-tender, non-distended, no guarding or peritoneal signs   EXTREMITY:  no cyanosis, clubbing or edema. +3/5 strength R wrist, +3/5 strength with plantar/dorsiflexion of R foot   +5/5 motor strength to left upper and left lower extremity   SKIN: normal coloration and turgor, ant neck surgical site CDI with steri-strips.      LAB:  CBC:   Recent Labs     21  0443 21  0544   WBC 7.7 5.2   HGB 13.7 16.0   HCT 41.6 48.5   MCV 96.1 95.1    See Reflexed IPF Result     BMP:   Recent Labs     21  0443 21  0544    138   K 3.9 4.6   * 106   CO2 24 22   BUN 9 8   CREATININE 0.77 0.68*   GLUCOSE 112* 91       RADIOLOGY:  No new imaging      [facility],

## 2021-01-08 NOTE — PLAN OF CARE
Nutrition Problem #1: Inadequate oral intake  Intervention: Food and/or Nutrient Delivery: Continue Current Diet, Remove ONS from meal trays per pt request  Nutritional Goals: meet greater than or equal to 75% of estimated nutrition needs with oral diet

## 2021-01-09 VITALS
OXYGEN SATURATION: 97 % | RESPIRATION RATE: 16 BRPM | BODY MASS INDEX: 26.42 KG/M2 | SYSTOLIC BLOOD PRESSURE: 115 MMHG | TEMPERATURE: 98.4 F | WEIGHT: 188.71 LBS | HEIGHT: 71 IN | DIASTOLIC BLOOD PRESSURE: 63 MMHG | HEART RATE: 48 BPM

## 2021-01-09 PROCEDURE — 6370000000 HC RX 637 (ALT 250 FOR IP): Performed by: GENERAL PRACTICE

## 2021-01-09 PROCEDURE — 6370000000 HC RX 637 (ALT 250 FOR IP): Performed by: STUDENT IN AN ORGANIZED HEALTH CARE EDUCATION/TRAINING PROGRAM

## 2021-01-09 PROCEDURE — 6360000002 HC RX W HCPCS: Performed by: STUDENT IN AN ORGANIZED HEALTH CARE EDUCATION/TRAINING PROGRAM

## 2021-01-09 PROCEDURE — 97116 GAIT TRAINING THERAPY: CPT

## 2021-01-09 PROCEDURE — 97530 THERAPEUTIC ACTIVITIES: CPT

## 2021-01-09 PROCEDURE — 97110 THERAPEUTIC EXERCISES: CPT

## 2021-01-09 RX ORDER — METHOCARBAMOL 500 MG/1
500 TABLET, FILM COATED ORAL EVERY 6 HOURS
Qty: 28 TABLET | Refills: 0
Start: 2021-01-09 | End: 2021-01-09

## 2021-01-09 RX ORDER — METHOCARBAMOL 500 MG/1
500 TABLET, FILM COATED ORAL EVERY 6 HOURS
Qty: 28 TABLET | Refills: 0 | Status: SHIPPED | OUTPATIENT
Start: 2021-01-09 | End: 2021-01-16

## 2021-01-09 RX ORDER — METHOCARBAMOL 500 MG/1
500 TABLET, FILM COATED ORAL EVERY 6 HOURS
Qty: 28 TABLET | Refills: 0 | Status: SHIPPED | OUTPATIENT
Start: 2021-01-09 | End: 2021-01-09

## 2021-01-09 RX ADMIN — METHOCARBAMOL TABLETS 500 MG: 500 TABLET, COATED ORAL at 07:16

## 2021-01-09 RX ADMIN — METHOCARBAMOL TABLETS 500 MG: 500 TABLET, COATED ORAL at 16:18

## 2021-01-09 RX ADMIN — IBUPROFEN 400 MG: 400 TABLET, FILM COATED ORAL at 11:51

## 2021-01-09 RX ADMIN — ENOXAPARIN SODIUM 30 MG: 30 INJECTION SUBCUTANEOUS at 08:34

## 2021-01-09 RX ADMIN — METHOCARBAMOL TABLETS 500 MG: 500 TABLET, COATED ORAL at 11:52

## 2021-01-09 RX ADMIN — IBUPROFEN 400 MG: 400 TABLET, FILM COATED ORAL at 08:33

## 2021-01-09 RX ADMIN — DOCUSATE SODIUM 100 MG: 100 CAPSULE, LIQUID FILLED ORAL at 08:33

## 2021-01-09 ASSESSMENT — PAIN DESCRIPTION - FREQUENCY: FREQUENCY: INTERMITTENT

## 2021-01-09 ASSESSMENT — PAIN SCALES - GENERAL
PAINLEVEL_OUTOF10: 6
PAINLEVEL_OUTOF10: 6

## 2021-01-09 NOTE — PROGRESS NOTES
Kevin Newell was evaluated today and a DME order was entered for a shower chair because he requires this to successfully complete daily living tasks of bathing and personal cares. A shower chair is necessary due to the patient's impaired ambulation and mobility restrictions. The need for this equipment was discussed with the patient and he understands and is in agreement.      Electronically signed by RAYSHAWN Poon CNP on 1/9/2021 at 2:57 PM

## 2021-01-09 NOTE — CARE COORDINATION
Met with pt to complete an SBIRT. Pt's girlfriend was in the room as well. Pt was alert and oriented. He states that he drinks 2-3 times a week and usually drinks 2-3 beers or wine at a time. He states that he smokes marijuana a couple of times a month. Pt declined intervention at this time. Alcohol Screening and Brief Intervention        Recent Labs     01/01/21  0559   ALC 92*       Alcohol Pre-screening  (MEN ONLY) How many times in the past year have you had 5 or more drinks in a day?: 1 or more       Alcohol Screening Audit  TOTAL SCORE[de-identified] 6    Drug Pre-Screening   How many times in the past year have you used a recreational drug or used a prescription medication for nonmedical reasons?: 1 or more    Drug Screening DAST  TOTAL SCORE[de-identified] 1    Mood Pre-Screening (PHQ-2)  During the past two weeks, have you been bothered by little interest or pleasure in doing things?: No  During the past two weeks, have you been bothered by feeling down, depressed, or hopeless?: No    Mood Pre-Screening (PHQ-9)         I have interviewed Fritz Asher, [id number] regarding  His alcohol consumption/drug use and risk for excessive use. Screenings were positive. Patient  Declined intervention at this time.   Deferred []    Completed on: 1/2/2021   MORGAN Kirkpatrick
Received a call from Mary Anne at  [facility]. She informs me that they will not have any beds available to mid to late next week. Will update Trauma and patient    Received a call from Marv Murphy at the [place] he informs me that patient does not qualify for the [place] rehab, but has another [place] facility in mind. He has asked that I fax updated notes to  [phone]. Awaiting updated PT notes to fax.
Transitional Planning     Spoke with patient and patient's SO at bedside; explained the importance of starting early discharge planning. Patient states he is leaning towards [facility] in [place], only if they allow visitors. Referral sent to [facility].
Transitional Planning   Was notified by RN and Trauma Coordinator that patient was interested in the [facility]. Referral faxed to   Chandrakant Kahn at [address] at [phone]  & Hbeert Espinosa at [address] Spinal 1 [facility] at [address] number is [phone] ext [phone]  Spoke with patient and patient's SO at bedside regarding discharge planning. [facility] states [facility] final decision and is looking over other rehabs as well. Notified patient that the sooner the choice is made the sooner he will get to recovery.
Transitional plan:    Remains in cervial collar, med management, pt/ot for mobilization, pt s/p cervical discectomy and fusion, goal is [facility] in [address] with admissions at [address] [facility] rehab they do not have bed availability at this time    [address] [facility] with [place] at [facility] at [facility], [facility] in [place], they are not accepting any patients until next week, her phone number is [phone], fax is [phone]  Next closest to patients home is [facility], I will speak with the patient to see if he is agreeable to use another facility    1141 Met with patient and signif other, notified them [facility] and [address] [facility] have no beds, gave choices of [facility] and our [facility] list provided. They would like to research their options and make a informed decision, will need to check back for choices.  Patient noted to be ambulating 250ft with ataxic gait, patient stating he plans to double that next time up, he may not require IP rehab by disch, Trauma will put in PMR eval
Transitional planning. Spoke with pt and he is requesting rolling walker and out pt PT/OT. PS MD for the above. [address] at [facility] [phone] and aware of pt needing wheeled walker. The pt also wanted a shower chair and he is out of stock for those. Also added shower chair normally not covered by insurance. He will drop off wheeled walker ASAP and let his  know. He said  at [facility] and will be over after. Called Yudith Lobo and she also stated shower chairs not normally covered by insurance. 1500 Went to room to discuss RW will be here in about an hour and shower chair update but Ashtyn Morales, house supervisor, in talking with pt and SO    [address] pt and let him know shower chair not covered and would be better going to [address], etc to pick one up. Verbalizes understanding.
provided  Will see how he progresses with PT/OT    Pt has chosen [facility]  Ref made  Ph# - [phone]  F# - [phone]        Electronically signed by Key Torres on 1/2/21 at 1:41 PM EST

## 2021-01-09 NOTE — PROGRESS NOTES
PROGRESS NOTE          PATIENT NAME: Kevin Newell  MEDICAL RECORD NO. [id number]  DATE: 2021  PRIMARY CARE PHYSICIAN: Fallon Jenkins DO    HD: # 8    ASSESSMENT    Patient Active Problem List   Diagnosis    [facility]    Fall from [facility]    C7 cervical fracture ([facility] [address]    Orthostatic hypotension       MEDICAL DECISION MAKING AND PLAN    1. Neuro/Pain  C6/C7 fracture  POD 7 s/p C6/7 discectomy and fusion with CSF leak repair  -Pain: tylenol, ibuprofen, refusing robaxin, neurontin, and norco  -Sensation intact bilateral upper and lowers, improving strength in RUE and RLE  -Keep cervical collar in place    -PT/OT. PMNR consulted. -F/u with NS in one week.      2. CV  -Hemodynamically stable, off pressors     3. Heme   -No evidence of bleeding     4. Pulm  Bilateral pulmonary contusions  -SAT mid to upper 90's on RA  -Encourage IS use hourly, 5000mL     5. Renal   -No valdez, urinating on own       6. GI   -Regular diet, ensure shakes   -Unmeasured BM : colace, glycolax  -No episodes of emesis     7. ID  -afebrile  -WBC 5.2, 7.7->8.7  -No infectious concerns at current      8. Endo  -Glucose 91, no insulin requirements      9. Lines  -PIV     10. Proph  -DVT: lovenox 30mg BID   -GI: tolerating diet, not indicated     11. Dispo  -SNF: patient working with SW/CM to decide on placement      SUBJECTIVE    Patient was seen and examined at bedside. Pain is well controlled. Patient is tolerating a diet, passing flatus and having bowel movements. Voiding spontaneously. Able to ambulate on his own and took 2 laps around the halls yesterday. Does complain of some right upper extremity weakness, but this is improving from prior days. He has no other complaints at this time.       OBJECTIVE  VITALS: Temp: Temp: 98.1 °F (36.7 °C)Temp  Av.7 °F (37.1 °C)  Min: 98.1 °F (36.7 °C)  Max: 99.1 °F (74.8 °C) BP Systolic (99BWU), QPX:132 , Min:99 , RSQ:118   Diastolic (24hrs), Av, Min:54, Max:75   Pulse Pulse  Av.8  Min: 51  Max: 58 Resp Resp  Avg: 15.8  Min: 15  Max: 16 Pulse ox SpO2  Av.3 %  Min: 97 %  Max: 99 %  GENERAL: alert and oriented, no distress  NEURO: positive findings: muscular weakness of the right upper and lower extremity, reflexes intact bilateral extremities. Achilles, patellar, and brachioradialis reflexes intact 2/4. HEAD: normocephalic, atraumatic  NECK: C-collar in place, anterior neck incision C/D/I   EYES: sclera clear, pupils equal and reactive   ENT: moist mucous membranes, cervical collar in place   LUNGS: normal effort on RA, no respiratory distress, no accessory muscle use   HEART: normal rate and regular rhythm  ABDOMEN: soft, non-tender, non-distended, no guarding or peritoneal signs   EXTREMITY:  no cyanosis, clubbing or edema. +3/5 strength R wrist, +3/5 strength with plantar/dorsiflexion of R foot   +5/5 motor strength to left upper and left lower extremity   SKIN: normal coloration and turgor, ant neck surgical site CDI with steri-strips. No intake/output data recorded. Drain/tube output:  No intake/output data recorded. LAB:  CBC:   Recent Labs     21  0443 21  0544   WBC 7.7 5.2   HGB 13.7 16.0   HCT 41.6 48.5   MCV 96.1 95.1    See Reflexed IPF Result     BMP:   Recent Labs     21  0443 21  0544    138   K 3.9 4.6   * 106   CO2 24 22   BUN 9 8   CREATININE 0.77 0.68*   GLUCOSE 112* 91     COAGS: No results for input(s): APTT, PROT, INR in the last 72 hours. Arminda Woodward MD  21, 7:55 AM         Attending Note      I have reviewed the above [facility] note(s) and I either performed the key elements of the medical history and physical exam or was present with the resident when the key elements of the medical history and physical exam were performed. I have discussed the findings, established the care plan and recommendations with Resident, TECSS RN, bedside nurse.

## 2021-01-09 NOTE — PROGRESS NOTES
Physical Therapy  Facility/Department: [address] ORTHO/MED SURG  Daily Treatment Note  NAME: Amarjit Stoddard  : 1975  MRN: [id number]    Date of Service: 2021    Discharge Recommendations:  Patient would benefit from continued therapy after discharge   PT Equipment Recommendations  Equipment Needed: Yes  Mobility Devices: Aileen Romero: Rolling    Assessment   Assessment: Increased AMB today 100 feet and 400 feet x1 with SBA for safety. Bed mobility Independent. Sit to stand SBA. RIght UE weakness and right LE weakness continues. Drags right LE at times. Up/Down 30 stair steps today with bilat handrails with SBA for safety. Cervical brace on when up. Prognosis: Good  PT Education: Functional Mobility Training;Plan of Care;General Safety; Injury Prevention;Gait Training;Equipment;Transfer Training;Goals  Activity Tolerance  Activity Tolerance: Patient Tolerated treatment well     Patient Diagnosis(es): The encounter diagnosis was Injury resulting from fall from height.     has no past medical history on file. has a past surgical history that includes Cervical disc surgery (2021) and cervical fusion (N/A, 2021). Restrictions  Restrictions/Precautions  Restrictions/Precautions: Fall Risk  Required Braces or Orthoses?: Yes  Required Braces or Orthoses  Cervical: c-collar  Position Activity Restriction  Other position/activity restrictions: Activity as tolerated; TLS clear; R groin ART line  Subjective   General  Response To Previous Treatment: Patient with no complaints from previous session. Subjective  Subjective: RN and pt agreeable to PT; Pt in supine arrival, denies pain, eager to ambulate. General Comment  Comments: Repost left UT/levator/rhomboid at left scapula.  Right LE and UE weakness continues          Orientation  Orientation  Overall Orientation Status: Within Normal Limits  Cognition      Objective   Bed mobility  Rolling to Left: Independent  Supine to Sit: Independent Sit to Supine: Independent  Scooting: Independent  Transfers  Sit to Stand: Supervision  Stand to sit: Supervision  Comment: Demonstrated safe transfers  Ambulation 1  Surface: level tile  Device: Rolling Walker  Assistance: Stand by assistance  Quality of Gait: Right LE weakness, difficulty with step through on right LE. Gait Deviations: Decreased step height;Decreased step length;Shuffles  Distance: 100 feet x1 and 400 feet x1  Comments: Drags right LE at times. No LOB. SBA for safety  Stairs/Curb  Stairs?: Yes  Stairs  # Steps : 30  Stairs Height: 6\"  Rails: Bilateral  Device: No Device  Assistance: Stand by assistance  Comment: No LOB. Demonstrated safe AMB of stairs using handrails. Other exercises  Other exercises?: Yes  Other exercises 1: Seated hamstring stretch 10\" x2 each. Self left posterior shoulder stretch 10\" x2  Other exercises 2: Supine: Left scapular mobilization x10 reps all planes     Goals  Short term goals  Time Frame for Short term goals: 14 visits  Short term goal 1: Ambulate 600ft with least restrictive AD independently   Short term goal 2: Ascend/descend 10 steps independently  Short term goal 3: Demo Good dynamic standing balance to decrease risk of falls    Patient Goals   Patient goals : Walk again.     Plan    Plan  Times per week: 5-6x/wk  Current Treatment Recommendations: Gait Training, Strengthening, Stair training, Balance Training, Functional Mobility Training, Endurance Training, Transfer Training, Safety Education & Training, Patient/Caregiver Education & Training  Safety Devices  Type of devices: Call light within reach, All fall risk precautions in place, Gait belt, Nurse notified     Therapy Time   Individual Concurrent Group Co-treatment   Time In 1300         Time Out 1340         Minutes 59 [place], [place]

## 2021-01-09 NOTE — PROGRESS NOTES
Devin Leigh was evaluated today and a DME order was entered for a wheeled walker because he requires this to successfully complete daily living tasks of ambulating. A wheeled walker is necessary due to the patient's unsteady gait, upper body weakness, and inability to  an ambulation device; and he can ambulate only by pushing a walker instead of a lesser assistive device such as a cane, crutch, or standard walker. The need for this equipment was discussed with the patient and he understands and is in agreement. Attending Note      I have reviewed the above [facility] note(s) and I either performed the key elements of the medical history and physical exam or was present with the resident when the key elements of the medical history and physical exam were performed. I have discussed the findings, established the care plan and recommendations with Resident, CASTRO RN, bedside nurse.     Francois Buckner MD  1/9/2021  2:46 PM

## 2021-01-09 NOTE — PROGRESS NOTES
Called to bedside as patient upset that he will not be going home with hydrocodone. Spoke to patient and patient's girlfriend at bedside with patient's bedside nurse. Patient and girlfriend state they are upset with discharge. Attempted to discuss what they feel they would need so patient can go home safely. Patient expresses that he is okay with going home but wants narcotics for his pain. Discussed with patient that he has not required narcotic pain medication since 1/4 and that non-narcotic pain medications could be given for discharge. Patient expresses anger regarding this. States he does not want non-narcotic pain medications for home. States this is the [facility] and worst care\" he has received and he wishes to have his records sent to his PCP. Patients concerns acknowledged. Again asked patient what he feels he would need to return home safely as he previously expressed concern with getting a rolling walker about 5 pm and having a 2 hour drive home. Explained to patient that since he has a long drive home, he may stay until tomorrow so all DME is ready for him when he returns home. Inquired if patient feels he would like a shower chair to safely complete ADLs. Patient again avoids this question and states he only needs 10 pills of hydrocodone to go home. Patient states he needs Vicodin because he is allergic to Percocet and roxicodone. Patient states he just wants to talk to \"Dominic\" (nursing supervisor). [place]ARRS checked-no patient identified on search. Discussed with Dr. Ivett Sung who is in agreement with plan to send patient home with Robaxin, rolling walker, and shower chair. House supervisor notified to speak to patient per patient's request by bedside nurse.     Electronically signed by RAYSHAWN Justice CNP on 1/9/2021 at 2:54 PM

## 2021-01-09 NOTE — PLAN OF CARE
Problem: Pain:  Goal: Pain level will decrease  Description: Pain level will decrease  Outcome: Completed  Goal: Control of acute pain  Description: Control of acute pain  Outcome: Completed  Goal: Control of chronic pain  Description: Control of chronic pain  Outcome: Completed     Problem: Skin Integrity:  Goal: Will show no infection signs and symptoms  Description: Will show no infection signs and symptoms  Outcome: Completed  Goal: Absence of new skin breakdown  Description: Absence of new skin breakdown  Outcome: Completed     Problem: Falls - Risk of:  Goal: Will remain free from falls  Description: Will remain free from falls  Outcome: Completed  Goal: Absence of physical injury  Description: Absence of physical injury  Outcome: Completed     Problem:  Activity:  Goal: Ability to tolerate increased activity will improve  Description: Ability to tolerate increased activity will improve  Outcome: Completed     Problem: Nutrition  Goal: Optimal nutrition therapy  Description: Nutrition Problem #1: Inadequate oral intake  Intervention: Food and/or Nutrient Delivery: Continue Current Diet, Remove ONS from meal trays per pt request.  Nutritional Goals: meet greater than or equal to 75% of estimated nutrition needs with oral diet     Outcome: Completed

## 2021-01-10 NOTE — DISCHARGE SUMMARY
DISCHARGE SUMMARY:    PATIENT NAME:  Mati Díaz  YOB: 1975  MEDICAL RECORD NO. [id number]  DATE: 01/10/21  PRIMARY CARE PHYSICIAN: Lillis Aschoff, DO  ADMIT DATE:  1/1/2021    DISCHARGE DATE:  1/9/2021  DISPOSITION:  Home  ADMITTING DIAGNOSIS:   Fall    DIAGNOSIS:   Patient Active Problem List   Diagnosis    [facility]    Fall from [facility]    C7 cervical fracture ([facility] [address]    Orthostatic hypotension       CONSULTANTS:  NS    PROCEDURES:   12/31: OR-C6/7 discectomy and fusion    HOSPITAL COURSE:   Mati Díaz is a 39 y.o. male who was admitted on 1/1/2021  Hospital Course:  Fall 30' over [facility], intoxicated. C6,7 fx. R upper and lower extremity weakness. Inj: c6,7fx, bl pulmonary contusions     12/31: OR w/ ns C6/7 discectomy and fusion (small CSF leak repaired). Levo postop for goal MAP >85  1/2: PT/OT, valdez out  1/4: map goal changed to >80. dizzy with meds-limiting MMPT; 1/6: Stay in ICU, DC midodrine, levo as needed, orthostatic positive, declined fluid bolus  1/7: Wean levo, improved subjective dizziness after 1L bolus x2    Labs and imaging were followed daily. On day of discharge Mati Díaz  was tolerating a regular diet  had adequate analgeia on oral medications  had no signs of complication. He was deemed medically stable for discharged to Home        PHYSICAL EXAMINATION:        Discharge Vitals:  height is 5' 11\" (1.803 m) and weight is 188 lb 11.4 oz (85.6 kg). His oral temperature is 98.4 °F (36.9 °C). His blood pressure is 115/63 and his pulse is 48 (abnormal). His respiration is 16 and oxygen saturation is 97%. Exam on day of discharge:  GENERAL: alert and oriented, no distress  NEURO: positive findings: muscular weakness of the right upper and lower extremity, reflexes intact bilateral extremities.  Achilles, patellar, and brachioradialis reflexes intact 2/4.    HEAD: normocephalic, atraumatic  NECK: C-collar in place, anterior neck incision C/D/I   EYES: sclera clear, pupils equal and reactive   ENT: moist mucous membranes, cervical collar in place   LUNGS: normal effort on RA, no respiratory distress, no accessory muscle use   HEART: normal rate and regular rhythm  ABDOMEN: soft, non-tender, non-distended, no guarding or peritoneal signs   EXTREMITY:  no cyanosis, clubbing or edema. +3/5 strength R wrist, +3/5 strength with plantar/dorsiflexion of R foot   +5/5 motor strength to left upper and left lower extremity   SKIN: normal coloration and turgor, ant neck surgical site CDI with steri-strips. LABS:     Recent Labs     01/08/21  0544   WBC 5.2   HGB 16.0   HCT 48.5   PLT See Reflexed IPF Result      K 4.6      CO2 22   BUN 8   CREATININE 0.68*       DIAGNOSTIC TESTS:    Xr Humerus Left (min 2 Views)    Result Date: 1/2/2021  EXAMINATION: TWO XRAY VIEWS OF THE LEFT HUMERUS 1/2/2021 7:40 am COMPARISON: None. HISTORY: ORDERING SYSTEM PROVIDED HISTORY: paresthesia TECHNOLOGIST PROVIDED HISTORY: paresthesia FINDINGS: No acute fracture. No dislocation. No radiopaque foreign object in the overlying soft tissues. No acute osseous abnormality. Xr Radius Ulna Left (2 Views)    Result Date: 1/2/2021  EXAMINATION: TWO XRAY VIEWS OF THE LEFT FOREARM 1/2/2021 7:40 am COMPARISON: None. HISTORY: ORDERING SYSTEM PROVIDED HISTORY: paresthesia TECHNOLOGIST PROVIDED HISTORY: paresthesia FINDINGS: No acute fracture. No dislocation. Joint spaces are maintained. No acute osseous abnormality. Xr Hand Left (min 3 Views)    Result Date: 1/2/2021  EXAMINATION: THREE XRAY VIEWS OF THE LEFT HAND 1/2/2021 7:40 am COMPARISON: None. HISTORY: ORDERING SYSTEM PROVIDED HISTORY: Paresthesia TECHNOLOGIST PROVIDED HISTORY: FINDINGS: No acute fracture. No dislocation. There is narrowing at the thumb MCP joint. Evaluation of the little finger distal phalanx is limited due to pulse oximeter. No acute osseous abnormality.      Ct Head Wo Contrast    Result Date: 1/1/2021  EXAMINATION: CT OF THE HEAD WITHOUT CONTRAST; CT OF THE CERVICAL SPINE WITHOUT CONTRAST 1/1/2021 5:52 am TECHNIQUE: CT of the head was performed without the administration of intravenous contrast. Dose modulation, iterative reconstruction, and/or weight based adjustment of the mA/kV was utilized to reduce the radiation dose to as low as reasonably achievable.; CT of the cervical spine was performed without the administration of intravenous contrast. Multiplanar reformatted images are provided for review. Dose modulation, iterative reconstruction, and/or weight based adjustment of the mA/kV was utilized to reduce the radiation dose to as low as reasonably achievable. COMPARISON: None. HISTORY: ORDERING SYSTEM PROVIDED HISTORY: trauma TECHNOLOGIST PROVIDED HISTORY: trauma Reason for Exam: fall 30 feet Acuity: Unknown Type of Exam: Unknown; ORDERING SYSTEM PROVIDED HISTORY: trauma TECHNOLOGIST PROVIDED HISTORY: trauma Reason for Exam: fall 30 feet Acuity: Unknown Type of Exam: Unknown FINDINGS: CT head: BRAIN/VENTRICLES: There is no acute intracranial hemorrhage, mass effect or midline shift. No abnormal extra-axial fluid collection. The gray-white differentiation is maintained without evidence of an acute infarct. There is no evidence of hydrocephalus. ORBITS: The visualized portion of the orbits demonstrate no acute abnormality. SINUSES: The visualized paranasal sinuses and mastoid air cells demonstrate no acute abnormality. SOFT TISSUES/SKULL: No acute abnormality of the visualized skull or soft tissues. CT cervical spine: BONES/ALIGNMENT: Impacted acute fracture of the superior anterior corner of the C7 vertebral body is noted with subtle minimal cortical discontinuity present at the inferior endplate. Associated C6 on C7 anterolisthesis is present which measures 2.5 mm. C6 and C7 vertebral body superior right facet oblique mildly displaced acute fracture is present. DEGENERATIVE CHANGES: C5/C6: Moderate disc height loss is present in association with posterior disc osteophyte complex which indents the ventral thecal sac narrowing the midline AP thecal sac diameter to 10 mm consistent with borderline central canal stenosis. Disc osteophyte complex encroaches upon the mildly narrows the bilateral neural foramen. Otherwise, multilevel minimal-to-mild spondylosis is present within the cervical spine without further evidence of central canal stenosis/compromise identified. SOFT TISSUES: There is no prevertebral soft tissue swelling. 1. No evidence of acute intracranial trauma. 2. C7 vertebral body superoanterior corner impacted acute fracture, as discussed above. 3. Adjacent C6 and C7 superior right facet oblique mildly displaced acute fractures. 4. Subsequent C6 on C7 traumatic anterolisthesis measuring 2.5 mm. Ct Cervical Spine Wo Contrast    Result Date: 1/1/2021  EXAMINATION: CT OF THE HEAD WITHOUT CONTRAST; CT OF THE CERVICAL SPINE WITHOUT CONTRAST 1/1/2021 5:52 am TECHNIQUE: CT of the head was performed without the administration of intravenous contrast. Dose modulation, iterative reconstruction, and/or weight based adjustment of the mA/kV was utilized to reduce the radiation dose to as low as reasonably achievable.; CT of the cervical spine was performed without the administration of intravenous contrast. Multiplanar reformatted images are provided for review. Dose modulation, iterative reconstruction, and/or weight based adjustment of the mA/kV was utilized to reduce the radiation dose to as low as reasonably achievable. COMPARISON: None.  HISTORY: ORDERING SYSTEM PROVIDED HISTORY: trauma TECHNOLOGIST PROVIDED HISTORY: trauma Reason for Exam: fall 30 feet Acuity: Unknown Type of Exam: Unknown; ORDERING SYSTEM PROVIDED HISTORY: trauma TECHNOLOGIST PROVIDED HISTORY: trauma Reason for Exam: fall 30 feet Acuity: Unknown Type of Exam: Unknown FINDINGS: CT head: BRAIN/VENTRICLES: There is no acute intracranial hemorrhage, mass effect or midline shift. No abnormal extra-axial fluid collection. The gray-white differentiation is maintained without evidence of an acute infarct. There is no evidence of hydrocephalus. ORBITS: The visualized portion of the orbits demonstrate no acute abnormality. SINUSES: The visualized paranasal sinuses and mastoid air cells demonstrate no acute abnormality. SOFT TISSUES/SKULL: No acute abnormality of the visualized skull or soft tissues. CT cervical spine: BONES/ALIGNMENT: Impacted acute fracture of the superior anterior corner of the C7 vertebral body is noted with subtle minimal cortical discontinuity present at the inferior endplate. Associated C6 on C7 anterolisthesis is present which measures 2.5 mm. C6 and C7 vertebral body superior right facet oblique mildly displaced acute fracture is present. DEGENERATIVE CHANGES: C5/C6: Moderate disc height loss is present in association with posterior disc osteophyte complex which indents the ventral thecal sac narrowing the midline AP thecal sac diameter to 10 mm consistent with borderline central canal stenosis. Disc osteophyte complex encroaches upon the mildly narrows the bilateral neural foramen. Otherwise, multilevel minimal-to-mild spondylosis is present within the cervical spine without further evidence of central canal stenosis/compromise identified. SOFT TISSUES: There is no prevertebral soft tissue swelling. 1. No evidence of acute intracranial trauma. 2. C7 vertebral body superoanterior corner impacted acute fracture, as discussed above. 3. Adjacent C6 and C7 superior right facet oblique mildly displaced acute fractures. 4. Subsequent C6 on C7 traumatic anterolisthesis measuring 2.5 mm.      Mri Cervical Spine Wo Contrast    Result Date: 1/1/2021  EXAMINATION: MRI OF THE CERVICAL SPINE WITHOUT CONTRAST 1/1/2021 10:55 am TECHNIQUE: Multiplanar multisequence MRI of the cervical spine was performed without the administration of intravenous contrast. COMPARISON: CT cervical spine 01/01/2021 HISTORY: ORDERING SYSTEM PROVIDED HISTORY: acute c7 impacted fracture, new right sided weakness TECHNOLOGIST PROVIDED HISTORY: acute c7 impacted fracture, new right sided weakness FINDINGS: BONES/ALIGNMENT: Acute fracture of the C7 vertebral body is appreciated with loss of 30% of the anterior column height. STIR signal abnormality is identified. There is additional mild edema within the inferior endplate of C6 compatible with nondisplaced fracture. Right sided articular pillar fractures are identified at C6 on C7. The right vertebral arterial flow voids are intact. There is anterolisthesis at C6-C7 measuring 3 mm. There is a hematoma within the ventral epidural space at C5 resulting in mild narrowing of the thecal sac. No cord compression is evident. There is a probable hemangioma within the T3 vertebral body. SPINAL CORD: There is hyperintense abnormality at C5 on C6 compatible with cord contusion. No cord expansion or significant hematoma is identified. SOFT TISSUES: Soft tissue edema is identified within the posterior elements from C3 through C7 compatible with ligamentous injury. C2-C3: There is no significant disc protrusion, spinal canal stenosis or neural foraminal narrowing. C3-C4: There is no significant disc protrusion, spinal canal stenosis or neural foraminal narrowing. C4-C5: There is no significant disc protrusion, spinal canal stenosis or neural foraminal narrowing. C5-C6: Moderate left foraminal stenosis identified due to uncovertebral joint hypertrophy and facet disease. C6-C7: There is no significant disc protrusion, spinal canal stenosis or neural foraminal narrowing. C7-T1: There is no significant disc protrusion, spinal canal stenosis or neural foraminal narrowing. Acute anterior column fracture of C7 with loss of 30% of vertebral body height.   Acute right lateral articular SOFT TISSUES: The lung apices are clear. No cervical or superior mediastinal lymphadenopathy. The larynx and pharynx are unremarkable. No acute abnormality of the salivary and thyroid glands. BONES: Cervical acute fractures as described on same day CT cervical spine without contrast examination. No CT angiographic evidence of acute trauma involving the major arterial vessels of the neck. Cervical spine acute fractures as described on CT cervical spine without contrast examination from same date. Xr Cervical Spine 1 Vw    Result Date: 1/1/2021  Radiology exam is complete. No Radiologist dictation. Please follow up with ordering provider. Xr Cervical Spine 1 Vw    Result Date: 1/1/2021  Radiology exam is complete. No Radiologist dictation. Please follow up with ordering provider. Xr Cervical Spine 1 Vw    Result Date: 1/1/2021  Radiology exam is complete. No Radiologist dictation. Please follow up with ordering provider. Ct Chest Abdomen Pelvis W Contrast    Result Date: 1/1/2021  EXAMINATION: CT OF THE CHEST, ABDOMEN, AND PELVIS WITH CONTRAST 1/1/2021 5:51 am TECHNIQUE: CT of the chest, abdomen and pelvis was performed with the administration of intravenous contrast. Multiplanar reformatted images are provided for review. Dose modulation, iterative reconstruction, and/or weight based adjustment of the mA/kV was utilized to reduce the radiation dose to as low as reasonably achievable. COMPARISON: None HISTORY: ORDERING SYSTEM PROVIDED HISTORY: trauma TECHNOLOGIST PROVIDED HISTORY: trauma Reason for Exam: trauma Acuity: Acute Type of Exam: Initial FINDINGS: Chest: Mediastinum: The heart is normal in size and configuration. No evidence of pericardial effusion is seen. No evidence of mediastinal or hilar lymphadenopathy or mass lesion is identified.  Lungs/pleura: Left upper lung lobe and anterior right middle lobe multifocal ill-defined consolidation is seen consistent with lung contusion in setting of trauma. Lungs are otherwise well aerated. No evidence of pleural thickening or pleural effusion is identified. No evidence of pneumothorax is seen. Evaluation significantly limited by patient breathing motion related artifact. Soft Tissues/Bones: The bones, skeletal muscle bundles, fascial planes and subcutaneous soft tissues are unremarkable in appearance. Abdomen/Pelvis: Organs: The liver, gallbladder, spleen, pancreas, adrenal glands, kidneys, are unremarkable in appearance. GI/Bowel: The stomach is unremarkable without wall thickening or distention. Bowel loops are unremarkable in appearance without evidence of obstruction, distension or mucosal thickening. Appendix is not clearly visualized and no evidence of acute appendicitis is seen. Pelvis: Soto catheter is noted within the nondistended but grossly unremarkable urinary bladder. No evidence of pelvic free fluid is seen. No pelvic or inguinal lymphadenopathy is identified. Peritoneum/Retroperitoneum: No evidence of intraperitoneal free fluid or free air is identified. No evidence of retroperitoneal or intraperitoneal lymphadenopathy is identified. Bones/Soft Tissues: The bones, skeletal muscle bundles, fascial planes and subcutaneous soft tissues are unremarkable in appearance. 1. Note: Study significantly limited by patient motion related artifact. 2. Ill-defined multifocal consolidation involving the left upper lung lobe and anterior right middle lobe suggestive of pulmonary parenchymal contusion in setting of trauma. 3. No further definitive evidence of acute trauma involving the chest, abdomen or pelvis. Ct Lumbar Spine Trauma Reconstruction    Result Date: 1/1/2021  EXAMINATION: CT OF THE THORACIC SPINE WITHOUT CONTRAST; CT OF THE LUMBAR SPINE WITHOUT CONTRAST 1/1/2021 TECHNIQUE: CT of the thoracic spine was performed without the administration of intravenous contrast. Multiplanar reformatted images are provided for review. Dose modulation, iterative reconstruction, and/or weight based adjustment of the mA/kV was utilized to reduce the radiation dose to as low as reasonably achievable.; CT of the lumbar spine was performed without the administration of intravenous contrast. Multiplanar reformatted images are provided for review. Dose modulation, iterative reconstruction, and/or weight based adjustment of the mA/kV was utilized to reduce the radiation dose to as low as reasonably achievable. COMPARISON: None. HISTORY: ORDERING SYSTEM PROVIDED HISTORY: trauma TECHNOLOGIST PROVIDED HISTORY: trauma Reason for Exam: trauma Acuity: Acute Type of Exam: Initial FINDINGS: BONES/ALIGNMENT: There is no acute fracture or traumatic malalignment. DEGENERATIVE CHANGES: T11/T12 increased density is seen within the intervertebral disc substance. Multilevel minimal spondylosis is noted without associated central canal stenosis/compromise identified. The neural foramina are patent. SOFT TISSUES: No paraspinal mass is seen. 1. No acute abnormality of the thoracic or lumbar spine. 2. T11/T12 increased intervertebral disc density suggesting presence of calcification, most likely secondary to degenerative change. 3. Multilevel minimal spondylosis without associated central canal stenosis/compromise. Ct Thoracic Spine Trauma Reconstruction    Result Date: 1/1/2021  EXAMINATION: CT OF THE THORACIC SPINE WITHOUT CONTRAST; CT OF THE LUMBAR SPINE WITHOUT CONTRAST 1/1/2021 TECHNIQUE: CT of the thoracic spine was performed without the administration of intravenous contrast. Multiplanar reformatted images are provided for review. Dose modulation, iterative reconstruction, and/or weight based adjustment of the mA/kV was utilized to reduce the radiation dose to as low as reasonably achievable.; CT of the lumbar spine was performed without the administration of intravenous contrast. Multiplanar reformatted images are provided for review.  Dose modulation, iterative reconstruction, and/or weight based adjustment of the mA/kV was utilized to reduce the radiation dose to as low as reasonably achievable. COMPARISON: None. HISTORY: ORDERING SYSTEM PROVIDED HISTORY: trauma TECHNOLOGIST PROVIDED HISTORY: trauma Reason for Exam: trauma Acuity: Acute Type of Exam: Initial FINDINGS: BONES/ALIGNMENT: There is no acute fracture or traumatic malalignment. DEGENERATIVE CHANGES: T11/T12 increased density is seen within the intervertebral disc substance. Multilevel minimal spondylosis is noted without associated central canal stenosis/compromise identified. The neural foramina are patent. SOFT TISSUES: No paraspinal mass is seen. 1. No acute abnormality of the thoracic or lumbar spine. 2. T11/T12 increased intervertebral disc density suggesting presence of calcification, most likely secondary to degenerative change. 3. Multilevel minimal spondylosis without associated central canal stenosis/compromise. DISCHARGE INSTRUCTIONS     Discharge Medications:        Medication List      START taking these medications    methocarbamol 500 MG tablet  Commonly known as: ROBAXIN  Take 1 tablet by mouth every 6 hours for 7 days           Where to Get Your Medications      You can get these medications from any pharmacy    Bring a paper prescription for each of these medications  · methocarbamol 500 MG tablet       Diet: No diet orders on file diet as tolerated  Activity: As instructed WEIGHT BEARING STATUS: Weight bearing as tolerated  Wound Care: Daily and as needed.     DISPOSITION: Home    Follow-up:  Sonia Tong DO  [address] [place] [address]  [phone]      follow up with your PCP re: this hospital visit    Matteo Meneses [address] [place] [address]  [phone]      Neurosurgery follow up        SIGNED:  RAYSHAWN Roche CNP   1/10/2021, 8:06 AM  Time Spent for discharge: 35 minutes        Attending Note      I have reviewed the above TECZORAIDA note(s) and I either performed the key elements of the medical history and physical exam or was present with the resident when the key elements of the medical history and physical exam were performed. I have discussed the findings, established the care plan and recommendations with Resident, CASTRO RN, bedside nurse.     Vicky Da Silva MD  1/10/2021  10:36 AM

## (undated) DEVICE — GARMENT,MEDLINE,DVT,INT,CALF,MED, GEN2: Brand: MEDLINE

## (undated) DEVICE — CONNECTOR TBNG WHT PLAS SUCT STR 5IN1 LTWT W/ M CONN

## (undated) DEVICE — GLOVE ORANGE PI 7   MSG9070

## (undated) DEVICE — DRESSING BORDERED ADH GZ UNIV GEN USE 5IN 4IN AND 2 1/2IN

## (undated) DEVICE — SPONGE,NEURO,0.5"X0.5",XR,STRL,10/PK: Brand: MEDLINE

## (undated) DEVICE — SPONGE,PEANUT,XRAY,ST,SM,3/8",5/CARD: Brand: MEDLINE INDUSTRIES, INC.

## (undated) DEVICE — PATIENT RETURN ELECTRODE, SINGLE-USE, CONTACT QUALITY MONITORING, ADULT, WITH 9FT CORD, FOR PATIENTS WEIGING OVER 33LBS. (15KG): Brand: MEGADYNE

## (undated) DEVICE — PACK PROCEDURE SURG LUMBAR SPINE SVMMC

## (undated) DEVICE — Z INACTIVE USE 2735373 APPLICATOR FBR LAIN COT WOOD TIP ECONOMICAL

## (undated) DEVICE — CONTAINER,SPECIMEN,4OZ,OR STRL: Brand: MEDLINE

## (undated) DEVICE — ABS MED DISTRACTION PIN, 14MM PATIENT (INNER): Brand: ABS MED DISTRACTION PIN

## (undated) DEVICE — IMPLANTABLE DEVICE
Type: IMPLANTABLE DEVICE | Status: NON-FUNCTIONAL
Brand: TRINICA®

## (undated) DEVICE — NEEDLE SPNL 18GA L3.5IN W/ QNCKE SHARPER BVL DURA CLICK

## (undated) DEVICE — INTENDED FOR TISSUE SEPARATION, AND OTHER PROCEDURES THAT REQUIRE A SHARP SURGICAL BLADE TO PUNCTURE OR CUT.: Brand: BARD-PARKER ® CARBON RIB-BACK BLADES

## (undated) DEVICE — ELECTRODE PT RET AD L9FT HI MOIST COND ADH HYDRGEL CORDED

## (undated) DEVICE — BLADE CLP TAPR HD WET DRY CAPABILITY GTT IN CHARGING USE

## (undated) DEVICE — SOLUTION SCRB 4OZ 4% CHG H2O AIDED FOR PREOPERATIVE SKIN

## (undated) DEVICE — DRAPE,REIN 53X77,STERILE: Brand: MEDLINE

## (undated) DEVICE — ELECTRODE ELECSURG NDL 2.8 INX7.2 CM COAT INSUL EDGE

## (undated) DEVICE — DRAPE MICSCP W117XL305CM DIA65MM LENS W VARI LENS2 FOR LEICA

## (undated) DEVICE — AGENT HEMOSTATIC SURGIFLOW MATRIX KIT W/THROMBIN

## (undated) DEVICE — BLADE ES ELASTOMERIC COAT INSUL DURABLE BEND UPTO 90DEG

## (undated) DEVICE — DRAPE,THYROID,SOFT,STERILE: Brand: MEDLINE

## (undated) DEVICE — GLOVE ORANGE PI 8   MSG9080

## (undated) DEVICE — SUTURE MCRYL SZ 4-0 L18IN ABSRB UD L19MM PS-2 3/8 CIR PRIM Y496G

## (undated) DEVICE — GOWN,AURORA,NONRNF,XL,30/CS: Brand: MEDLINE

## (undated) DEVICE — 3.0MM PRECISION NEURO (MATCH HEAD)

## (undated) DEVICE — SUTURE VCRL SZ 3-0 L18IN ABSRB UD L26MM SH 1/2 CIR J864D

## (undated) DEVICE — STRIP,CLOSURE,WOUND,MEDI-STRIP,1/2X4: Brand: MEDLINE

## (undated) DEVICE — SPONGE,NEURO,0.5"X3",XR,STRL,LF,10/PK: Brand: MEDLINE

## (undated) DEVICE — 3M™ IOBAN™ 2 ANTIMICROBIAL INCISE DRAPE 6650EZ: Brand: IOBAN™ 2

## (undated) DEVICE — 6.0MM PRECISION ROUND

## (undated) DEVICE — NEEDLE HYPO 25GA L1.5IN BLU POLYPR HUB S STL REG BVL STR

## (undated) DEVICE — SYRINGE MED 10ML TRNSLUC BRL PLUNG BLK MRK POLYPR CTRL